# Patient Record
Sex: FEMALE | Race: WHITE | Employment: FULL TIME | URBAN - METROPOLITAN AREA
[De-identification: names, ages, dates, MRNs, and addresses within clinical notes are randomized per-mention and may not be internally consistent; named-entity substitution may affect disease eponyms.]

---

## 2017-02-05 RX ORDER — ONDANSETRON 4 MG/1
TABLET, ORALLY DISINTEGRATING ORAL
Qty: 15 TAB | Refills: 0 | Status: SHIPPED | OUTPATIENT
Start: 2017-02-05 | End: 2017-06-14

## 2017-02-19 RX ORDER — FLUOXETINE HYDROCHLORIDE 40 MG/1
CAPSULE ORAL
Qty: 30 CAP | Refills: 0 | Status: SHIPPED | OUTPATIENT
Start: 2017-02-19 | End: 2017-03-23 | Stop reason: SDUPTHER

## 2017-02-19 RX ORDER — ATORVASTATIN CALCIUM 80 MG/1
TABLET, FILM COATED ORAL
Qty: 90 TAB | Refills: 0 | Status: SHIPPED | OUTPATIENT
Start: 2017-02-19 | End: 2017-06-14

## 2017-03-23 RX ORDER — FLUOXETINE HYDROCHLORIDE 40 MG/1
CAPSULE ORAL
Qty: 30 CAP | Refills: 0 | Status: SHIPPED | OUTPATIENT
Start: 2017-03-23 | End: 2017-06-06 | Stop reason: SDUPTHER

## 2017-06-02 RX ORDER — ALPRAZOLAM 0.25 MG/1
TABLET ORAL
Qty: 30 TAB | Refills: 0 | Status: CANCELLED | OUTPATIENT
Start: 2017-06-02

## 2017-06-02 RX ORDER — FLUOXETINE HYDROCHLORIDE 40 MG/1
CAPSULE ORAL
Qty: 30 CAP | Refills: 0 | Status: CANCELLED | OUTPATIENT
Start: 2017-06-02

## 2017-06-06 RX ORDER — FLUOXETINE HYDROCHLORIDE 40 MG/1
CAPSULE ORAL
Qty: 30 CAP | Refills: 0 | Status: SHIPPED | OUTPATIENT
Start: 2017-06-06 | End: 2017-07-09 | Stop reason: SDUPTHER

## 2017-06-06 RX ORDER — ALPRAZOLAM 0.25 MG/1
TABLET ORAL
Qty: 30 TAB | Refills: 0 | Status: SHIPPED | OUTPATIENT
Start: 2017-06-06 | End: 2017-06-27 | Stop reason: SDUPTHER

## 2017-06-06 NOTE — TELEPHONE ENCOUNTER
Called, spoke to pt. Two pt identifiers confirmed. Pt informed that 30 day supply has been called in. Pt verbalized understanding of information discussed w/ no further questions at this time.

## 2017-06-06 NOTE — TELEPHONE ENCOUNTER
PSR spoke w/ pt to schedule f/u appt. Pt is scheduled for Wednesday 6/14/17 @ 12:15. Pt also is inquiring about if her pending Rx's will be filled prior to visit. Pt can best be reached at [phone] in regards to matter.

## 2017-06-06 NOTE — TELEPHONE ENCOUNTER
The following prescription was called into pt's local pharmacy:    ALPRAZolam Supriya Stager) 0.25 mg tablet [[id number]]   Order Details   Dose, Route, Frequency: As Directed    Dispense Quantity:  30 Tab Refills:  0 Fills Remaining:  --           Sig: TAKE 1 TABLET BY MOUTH EVERY EVENING AS NEEDED          Written Date:  06/06/17 Expiration Date:  --     Start Date:  06/06/17 End Date:  --            Ordering Provider:  -- YESICA #:  -- NPI:  --    Authorizing Provider:  Yusef Gallegos MD YESICA #:  [id number] NPI:  [id number]       Original Order:  ALPRAZolam Supriya Stager) 0.25 mg tablet [[id number]]      Pharmacy:  [facility] [address] [place] [address] #:  [id number]     Pharmacy Comments:  --          Quantity Remaining:  -- Quantity Filled:  --

## 2017-06-06 NOTE — TELEPHONE ENCOUNTER
Patient states she needs a call back today in reference to refill request from 6/2/17. Patient states she needs to know status of her request as she is out of medication & has been for 3 days & is starting to feel side effects. Reminded of 48 business hour turn around for future refill requests. Please call to advise.  Thank you

## 2017-06-14 ENCOUNTER — OFFICE VISIT (OUTPATIENT)
Dept: INTERNAL MEDICINE CLINIC | Age: 62
End: 2017-06-14

## 2017-06-14 VITALS
OXYGEN SATURATION: 97 % | WEIGHT: 174 LBS | HEART RATE: 75 BPM | DIASTOLIC BLOOD PRESSURE: 81 MMHG | SYSTOLIC BLOOD PRESSURE: 133 MMHG | TEMPERATURE: 97.9 F | HEIGHT: 65 IN | BODY MASS INDEX: 28.99 KG/M2 | RESPIRATION RATE: 16 BRPM

## 2017-06-14 DIAGNOSIS — I10 ESSENTIAL HYPERTENSION: Primary | ICD-10-CM

## 2017-06-14 DIAGNOSIS — E78.00 PURE HYPERCHOLESTEROLEMIA: ICD-10-CM

## 2017-06-14 DIAGNOSIS — F32.9 REACTIVE DEPRESSION: ICD-10-CM

## 2017-06-14 DIAGNOSIS — R73.03 PREDIABETES: ICD-10-CM

## 2017-06-14 DIAGNOSIS — Z99.89 OSA ON CPAP: ICD-10-CM

## 2017-06-14 DIAGNOSIS — G47.33 OSA ON CPAP: ICD-10-CM

## 2017-06-14 DIAGNOSIS — Z87.891 PERSONAL HISTORY OF NICOTINE DEPENDENCE: ICD-10-CM

## 2017-06-14 NOTE — PROGRESS NOTES
HISTORY OF PRESENT ILLNESS  Ramona Ryan is a 63 y.o. female. HPI   Last here 16. Pt is here to f/u on chronic conditions. Pt is not fasting today. BP today is 133/81  BP at home running around 130/80  Continues losartan 50mg daily    Continues lipitor 80mg daily for cholesterol     Reviewed last labs   Ordered fasting labs today  She will complete these next week. Wt is up 14 lbs since last visit   She attributes this to quitting smoking. Discussed diet and weight loss. Pt has seen urogyn-- Dr. Edward Marsh-- since last visit for recurrent hematuria  She did not f/u with evaluation and testing, has trouble getting off of work  Still needs to complete evaluation    Continues prozac to 40mg daily   She is happy with this dose, working well for depression  She notes feeling more tired recently, but notes she has had an increased work load at work   Pt has xanax to use prn as well, works well   Pt takes xanax about once weekly or less     Not compliant with cpap nightly for JUAN ANTONIO  She reports recent fatigue and waking up feeling tired. She has not seen Dr. Sunday Tamayo (sleep) recently   Advised her to f/u with Dr. Sunday Tamayo for cpap     Pt has quit smoking last year  Encouraged continued cessation  Discussed CT lung screen - She is amenable to this. Reviewed hcm    PREVENTIVE:    Colonoscopy: 08, due in 2018, at [facility]    Pap: hysterectomy and bso in her 30's for endometriosis. Dr Gini Hyman 2012 will see every other year--will schedule, reminded again  -- reminded again   Mammogram 1/15 --still due, reminded again to schedule, ordered again for her   Dexa: due , declines for now, wants to wait till age 63.    Tdap: 2012  Pneumovax: not yet    Zostavax: not yet    Flu shot: given today 16  Hep C Screenin/24/15, negative  Lipids .    A1c:  5.8,  5.8  EKG  nsr; repeat today  Eye exam: ; [facility]    Patient Active Problem List Diagnosis Date Noted    Depression 09/21/2012    Anxiety 09/21/2012    Hyperlipidemia 09/21/2012    HTN (hypertension) 09/21/2012    Microscopic hematuria 09/21/2012    Fatty liver 09/21/2012    JUAN ANTONIO on CPAP 09/21/2012     Current Outpatient Prescriptions   Medication Sig Dispense Refill    FLUoxetine (PROZAC) 40 mg capsule TAKE 1 CAPSULE BY MOUTH DAILY 30 Cap 0    ALPRAZolam (XANAX) 0.25 mg tablet TAKE 1 TABLET BY MOUTH EVERY EVENING AS NEEDED 30 Tab 0    losartan (COZAAR) 50 mg tablet Take 1 Tab by mouth daily. 30 Tab 0    atorvastatin (LIPITOR) 80 mg tablet TAKE 1 TABLET BY MOUTH NIGHTLY 90 Tab 0    nystatin (MYCOSTATIN) powder aaa tid prn 60 g 0    atorvastatin (LIPITOR) 80 mg tablet TAKE 1 TABLET BY MOUTH NIGHTLY 90 Tab 0    ondansetron (ZOFRAN ODT) 4 mg disintegrating tablet DISSOLVE 1 TABLET ON THE TONGUE EVERY 8 HOURS AS NEEDED FOR NAUSEA 15 Tab 0    gabapentin (NEURONTIN) 300 mg capsule Take 1 Cap by mouth two (2) times a day. 60 Cap 1    HYDROcodone-acetaminophen (NORCO) 5-325 mg per tablet Take 1 Tab by mouth every eight (8) hours as needed for Pain. Max Daily Amount: 3 Tabs. 20 Tab 0    ergocalciferol (ERGOCALCIFEROL) 50,000 unit capsule Take 1 Cap by mouth every seven (7) days. 16 Cap 0    albuterol (PROAIR HFA) 90 mcg/actuation inhaler Take 2 Puffs by inhalation every four (4) hours as needed for Wheezing.  1 Inhaler 3    FLUoxetine (PROZAC) 20 mg capsule TAKE 1 CAPSULE BY MOUTH EVERY DAY 30 Cap 0    doxycycline (VIBRAMYCIN) 50 mg capsule        Past Surgical History:   Procedure Laterality Date    HX HYSTERECTOMY      HX ORTHOPAEDIC      right foot surgery    HX TONSILLECTOMY        Lab Results  Component Value Date/Time   WBC 6.2 11/18/2016 08:49 AM   HGB 14.4 11/18/2016 08:49 AM   HCT 42.3 11/18/2016 08:49 AM   PLATELET 183 48/46/6495 08:49 AM   MCV 91 11/18/2016 08:49 AM     Lab Results  Component Value Date/Time   Cholesterol, total 226 11/18/2016 08:49 AM   HDL Cholesterol 87 11/18/2016 08:49 AM   LDL, calculated 121 11/18/2016 08:49 AM   Triglyceride 90 11/18/2016 08:49 AM     Lab Results  Component Value Date/Time   GFR est non-AA 95 11/18/2016 08:49 AM   GFR est  11/18/2016 08:49 AM   Creatinine 0.70 11/18/2016 08:49 AM   BUN 14 11/18/2016 08:49 AM   Sodium 143 11/18/2016 08:49 AM   Potassium 4.2 11/18/2016 08:49 AM   Chloride 103 11/18/2016 08:49 AM   CO2 25 11/18/2016 08:49 AM        Review of Systems   Constitutional: Positive for malaise/fatigue. Respiratory: Negative for shortness of breath. Cardiovascular: Negative for chest pain. Physical Exam   Constitutional: She is oriented to person, place, and time. She appears well-developed and well-nourished. No distress. HENT:   Head: Normocephalic and atraumatic. Right Ear: External ear normal.   Left Ear: External ear normal.   Mouth/Throat: Oropharynx is clear and moist. No oropharyngeal exudate. Dry mouth   Eyes: Conjunctivae and EOM are normal. Right eye exhibits no discharge. Left eye exhibits no discharge. Neck: Normal range of motion. Neck supple. Cardiovascular: Normal rate, regular rhythm, normal heart sounds and intact distal pulses. Exam reveals no gallop and no friction rub. No murmur heard. Pulmonary/Chest: Effort normal and breath sounds normal. No respiratory distress. She has no wheezes. She has no rales. She exhibits no tenderness. Abdominal: Soft. She exhibits no distension and no mass. There is no tenderness. There is no rebound and no guarding. Musculoskeletal: Normal range of motion. She exhibits no edema, tenderness or deformity. Lymphadenopathy:     She has no cervical adenopathy. Neurological: She is alert and oriented to person, place, and time. Coordination normal.   Skin: Skin is warm and dry. No rash noted. She is not diaphoretic. No erythema. No pallor. Psychiatric: She has a normal mood and affect.  Her behavior is normal.       ASSESSMENT and PLAN    ICD-10-CM ICD-9-CM    1. Essential hypertension    Controlled on losartan 50mg daily. Continue. I10 401.9 REFERRAL TO SLEEP STUDIES      CBC W/O DIFF      METABOLIC PANEL, COMPREHENSIVE      TSH 3RD GENERATION      HEMOGLOBIN A1C WITH EAG      LIPID PANEL   2. Personal history of nicotine dependence    CT lung cancer screen ordered. Z87.891 V15.82 CT LOW DOSE LUNG CANCER SCREENING      REFERRAL TO SLEEP STUDIES      CBC W/O DIFF      METABOLIC PANEL, COMPREHENSIVE      TSH 3RD GENERATION      HEMOGLOBIN A1C WITH EAG      LIPID PANEL   3. JUAN ANTONIO on CPAP    Not wearing cpap. Gained weight and feeling more fatigued. Advised her to schedule f/u with Dr. Sunday Tamayo. G47.33 327.23 REFERRAL TO SLEEP STUDIES    Z99.89 V46.8 CBC W/O DIFF      METABOLIC PANEL, COMPREHENSIVE      TSH 3RD GENERATION      HEMOGLOBIN A1C WITH EAG      LIPID PANEL   4. Pure hypercholesterolemia    On max dose lipitor 80mg. Weight has climbed. Needs to focus on weight loss as LDL is not at goal. Cannot increase dose of lipitor. May need to change to crestor to see if this is more effective. Labs ordered. E78.00 272.0 REFERRAL TO SLEEP STUDIES      CBC W/O DIFF      METABOLIC PANEL, COMPREHENSIVE      TSH 3RD GENERATION      HEMOGLOBIN A1C WITH EAG      LIPID PANEL   5. Prediabetes    Weight up over 10 lbs. Check A1c today and evaluate for progression towards diabetes. Focus on weight loss. R73.03 790.29 REFERRAL TO SLEEP STUDIES      CBC W/O DIFF      METABOLIC PANEL, COMPREHENSIVE      TSH 3RD GENERATION      HEMOGLOBIN A1C WITH EAG      LIPID PANEL   6. Reactive depression    Controlled on prozac 40mg. Continue. F32.9 300.4 REFERRAL TO SLEEP STUDIES      CBC W/O DIFF      METABOLIC PANEL, COMPREHENSIVE      TSH 3RD GENERATION      HEMOGLOBIN A1C WITH EAG      LIPID PANEL        Written by Pete Leon, as dictated by Ceasar Sánchez MD.     Current diagnosis and concerns discussed with pt at length.  Understands risks and benefits or current treatment plan and medications and accepts the treatment and medication with any possible risks.   Pt asks appropriate questions which were answered.   Pt instructed to call with any concerns or problems. This note will not be viewable in [address]

## 2017-06-14 NOTE — MR AVS SNAPSHOT
Visit Information Date & Time Provider Department Dept. Phone Encounter #  
 6/14/2017 12:15 PM Emmanuelle Triplett, [address] [facility] [phone] [id number] Follow-up Instructions Return in about 6 months (around 12/14/2017). Upcoming Health Maintenance Date Due  
 PAP AKA CERVICAL CYTOLOGY 9/12/2015 BREAST CANCER SCRN MAMMOGRAM 1/3/2017 INFLUENZA AGE 9 TO ADULT 8/1/2017 COLONOSCOPY 2/8/2018 DTaP/Tdap/Td series (2 - Td) 9/21/2022 Allergies as of 6/14/2017  Review Complete On: 6/14/2017 By: Emmanuelle Triplett MD  
 No Known Allergies Current Immunizations  Reviewed on 9/21/2012 Name Date Influenza Vaccine 11/8/2014, 10/15/2013 Influenza Vaccine (Quad) PF 11/18/2016, 11/30/2015 TDAP Vaccine 9/21/2012 Not reviewed this visit You Were Diagnosed With   
  
 Codes Comments Essential hypertension    -  Primary ICD-10-CM: I10 
ICD-9-CM: 401.9 Personal history of nicotine dependence     ICD-10-CM: D31.621 ICD-9-CM: V15.82 JUAN ANTONIO on CPAP     ICD-10-CM: G47.33, Z99.89 ICD-9-CM: 327.23, V46.8 Pure hypercholesterolemia     ICD-10-CM: E78.00 ICD-9-CM: 272.0 Prediabetes     ICD-10-CM: R73.03 
ICD-9-CM: 790.29 Reactive depression     ICD-10-CM: F32.9 ICD-9-CM: 300.4 Vitals BP Pulse Temp Resp Height(growth percentile) Weight(growth percentile) 133/81 (BP 1 Location: Left arm, BP Patient Position: Sitting) 75 97.9 °F (36.6 °C) (Oral) 16 5' 5\" (1.651 m) 174 lb (78.9 kg) SpO2 BMI OB Status Smoking Status 97% 28.96 kg/m2 Hysterectomy Former Smoker Vitals History BMI and BSA Data Body Mass Index Body Surface Area  
 28.96 kg/m 2 1.9 m 2 Preferred Pharmacy Pharmacy Name Phone [facility] [phone], [facility] [place] [address] [phone] Your Updated Medication List  
  
   
 This list is accurate as of: 6/14/17 12:32 PM.  Always use your most recent med list.  
  
  
  
  
 albuterol 90 mcg/actuation inhaler Commonly known as:  PROAIR HFA Take 2 Puffs by inhalation every four (4) hours as needed for Wheezing. ALPRAZolam 0.25 mg tablet Commonly known as:  XANAX  
TAKE 1 TABLET BY MOUTH EVERY EVENING AS NEEDED  
  
 atorvastatin 80 mg tablet Commonly known as:  LIPITOR  
TAKE 1 TABLET BY MOUTH NIGHTLY FLUoxetine 40 mg capsule Commonly known as:  PROzac TAKE 1 CAPSULE BY MOUTH DAILY losartan 50 mg tablet Commonly known as:  COZAAR Take 1 Tab by mouth daily. nystatin powder Commonly known as:  MYCOSTATIN  
aaa tid prn We Performed the Following CBC W/O DIFF [43432 CPT(R)] HEMOGLOBIN A1C WITH EAG [36845 CPT(R)] LIPID PANEL [76302 CPT(R)] METABOLIC PANEL, COMPREHENSIVE [27184 CPT(R)] REFERRAL TO SLEEP STUDIES [REF99 Custom] Comments:  
 Please evaluate patient for yobany TSH 3RD GENERATION [21572 CPT(R)] Follow-up Instructions Return in about 6 months (around 12/14/2017). To-Do List   
 06/14/2017 Imaging:  CT LOW DOSE LUNG CANCER SCREENING Referral Information Referral ID Referred By Referred To  
  
 [id number] Cheryle Gray Not Available Visits Status Start Date End Date 1 New Request 6/14/17 6/14/18 If your referral has a status of pending review or denied, additional information will be sent to support the outcome of this decision. Referral ID Referred By Referred To  
 [id number] Yumi Mancini MD  
   [address] [place] [address] [address] Phone: [phone] Fax: [phone] Visits Status Start Date End Date 1 New Request 6/14/17 6/14/18 If your referral has a status of pending review or denied, additional information will be sent to support the outcome of this decision. Introducing [facility]! Dear Jerson No: 
Thank you for requesting a [employer] account. Our records indicate that you already have an active [employer] account. You can access your account anytime at [email or web address] Did you know that you can access your hospital and ER discharge instructions at any time in [employer]? You can also review all of your test results from your hospital stay or ER visit. Additional Information If you have questions, please visit the Frequently Asked Questions section of the [employer] website at [email or web address] Remember, [employer] is NOT to be used for urgent needs. For medical emergencies, dial 911. Now available from your iPhone and Android! Please provide this summary of care documentation to your next provider. Your primary care clinician is listed as Mary Law. If you have any questions after today's visit, please call [phone].

## 2017-06-20 LAB
ALBUMIN SERPL-MCNC: 4.4 G/DL (ref 3.5–5.5)
ALBUMIN/GLOB SERPL: 1.7 {RATIO} (ref 1.2–2.2)
ALP SERPL-CCNC: 109 IU/L (ref 39–117)
ALT SERPL-CCNC: 27 IU/L (ref 0–32)
AST SERPL-CCNC: 18 IU/L (ref 0–40)
BILIRUB SERPL-MCNC: 0.6 MG/DL (ref 0–1.2)
BUN SERPL-MCNC: 10 MG/DL (ref 6–24)
BUN/CREAT SERPL: 14 (ref 9–23)
CALCIUM SERPL-MCNC: 9.2 MG/DL (ref 8.7–10.2)
CHLORIDE SERPL-SCNC: 99 MMOL/L (ref 96–106)
CHOLEST SERPL-MCNC: 212 MG/DL (ref 100–199)
CO2 SERPL-SCNC: 25 MMOL/L (ref 18–29)
CREAT SERPL-MCNC: 0.7 MG/DL (ref 0.57–1)
ERYTHROCYTE [DISTWIDTH] IN BLOOD BY AUTOMATED COUNT: 12.9 % (ref 12.3–15.4)
EST. AVERAGE GLUCOSE BLD GHB EST-MCNC: 108 MG/DL
GLOBULIN SER CALC-MCNC: 2.6 G/DL (ref 1.5–4.5)
GLUCOSE SERPL-MCNC: 101 MG/DL (ref 65–99)
HBA1C MFR BLD: 5.4 % (ref 4.8–5.6)
HCT VFR BLD AUTO: 41.5 % (ref 34–46.6)
HDLC SERPL-MCNC: 93 MG/DL
HGB BLD-MCNC: 13.7 G/DL (ref 11.1–15.9)
LDLC SERPL CALC-MCNC: 98 MG/DL (ref 0–99)
MCH RBC QN AUTO: 30.6 PG (ref 26.6–33)
MCHC RBC AUTO-ENTMCNC: 33 G/DL (ref 31.5–35.7)
MCV RBC AUTO: 93 FL (ref 79–97)
PLATELET # BLD AUTO: 322 X10E3/UL (ref 150–379)
POTASSIUM SERPL-SCNC: 4.5 MMOL/L (ref 3.5–5.2)
PROT SERPL-MCNC: 7 G/DL (ref 6–8.5)
RBC # BLD AUTO: 4.48 X10E6/UL (ref 3.77–5.28)
SODIUM SERPL-SCNC: 141 MMOL/L (ref 134–144)
TRIGL SERPL-MCNC: 106 MG/DL (ref 0–149)
TSH SERPL DL<=0.005 MIU/L-ACNC: 1.46 UIU/ML (ref 0.45–4.5)
VLDLC SERPL CALC-MCNC: 21 MG/DL (ref 5–40)
WBC # BLD AUTO: 6.2 X10E3/UL (ref 3.4–10.8)

## 2017-06-26 RX ORDER — ALPRAZOLAM 0.25 MG/1
TABLET ORAL
Qty: 30 TAB | Refills: 0 | OUTPATIENT
Start: 2017-06-26

## 2017-06-26 NOTE — TELEPHONE ENCOUNTER
This was filled 2 weeks ago and pt only uses it about once per week so should have plenty left can you find out if the script from 2 weeks ago was actually called in?

## 2017-06-27 RX ORDER — ALPRAZOLAM 0.25 MG/1
TABLET ORAL
Qty: 30 TAB | Refills: 0 | Status: SHIPPED | OUTPATIENT
Start: 2017-06-27 | End: 2017-08-29 | Stop reason: SDUPTHER

## 2017-06-28 NOTE — PROGRESS NOTES
Called in following rx to pharmacy:       ALPRAZolam Vaibhav Watters 0.25 mg tablet [[id number]]   Order Details   Dose, Route, Frequency: As Directed    Dispense Quantity:  30 Tab Refills:  0 Fills Remaining:  --           Sig: TAKE 1 TABLET BY MOUTH EVERY EVENING AS NEEDED

## 2017-08-15 RX ORDER — LOSARTAN POTASSIUM 50 MG/1
TABLET ORAL
Qty: 30 TAB | Refills: 0 | Status: SHIPPED | OUTPATIENT
Start: 2017-08-15 | End: 2017-09-11 | Stop reason: SDUPTHER

## 2017-08-29 RX ORDER — ALPRAZOLAM 0.25 MG/1
TABLET ORAL
Qty: 30 TAB | Refills: 0 | Status: SHIPPED | OUTPATIENT
Start: 2017-08-29 | End: 2017-11-29 | Stop reason: SDUPTHER

## 2017-08-30 ENCOUNTER — TELEPHONE (OUTPATIENT)
Dept: INTERNAL MEDICINE CLINIC | Age: 62
End: 2017-08-30

## 2017-08-30 NOTE — TELEPHONE ENCOUNTER
The following prescription was called into pt's local pharmacy:    ALPRAZolam Danica Linda) 0.25 mg tablet [[id number]]   Order Details   Dose, Route, Frequency: As Directed    Dispense Quantity:  30 Tab Refills:  0 Fills Remaining:  --           Sig: TAKE 1 TABLET BY MOUTH EVERY EVENING AS NEEDED          Written Date:  08/29/17 Expiration Date:  --     Start Date:  08/29/17 End Date:  --            Ordering Provider:  -- YESICA #:  -- NPI:  --    Authorizing Provider:  Sarah Pham MD YESICA #:  [id number] NPI:  [id number]    Ordering User:  Sarah Pham MD               Original Order:  ALPRAZolam Danica Linda) 0.25 mg tablet [[id number]]      Pharmacy:  [facility] [address] [place] [address] [address] AT [place] [address] #:  [id number]     Pharmacy Comments:  --          Quantity Remaining:  -- Quantity Filled:  --

## 2017-09-11 RX ORDER — LOSARTAN POTASSIUM 50 MG/1
TABLET ORAL
Qty: 30 TAB | Refills: 0 | Status: SHIPPED | OUTPATIENT
Start: 2017-09-11 | End: 2017-10-08 | Stop reason: SDUPTHER

## 2017-11-29 RX ORDER — ALPRAZOLAM 0.25 MG/1
TABLET ORAL
Qty: 30 TAB | Refills: 0 | Status: SHIPPED | OUTPATIENT
Start: 2017-11-29 | End: 2018-03-27 | Stop reason: SDUPTHER

## 2017-11-30 ENCOUNTER — DOCUMENTATION ONLY (OUTPATIENT)
Dept: INTERNAL MEDICINE CLINIC | Age: 62
End: 2017-11-30

## 2017-11-30 NOTE — PROGRESS NOTES
Following rx was faxed to pharmacy with confirmation received:      ALPRAZolam (XANAX) 0.25 mg tablet [[id number]]   Order Details   Dose, Route, Frequency: As Directed    Dispense Quantity:  30 Tab Refills:  0 Fills Remaining:  --           Sig: TAKE 1 TABLET BY MOUTH EVERY EVENING AS NEEDED          Written Date:  11/29/17 Expiration Date:  --     Start Date:  11/29/17 End Date:  --

## 2018-01-12 RX ORDER — FLUOXETINE HYDROCHLORIDE 40 MG/1
CAPSULE ORAL
Qty: 90 CAP | Refills: 0 | Status: SHIPPED | OUTPATIENT
Start: 2018-01-12 | End: 2018-07-16 | Stop reason: SDUPTHER

## 2018-01-12 RX ORDER — ATORVASTATIN CALCIUM 80 MG/1
TABLET, FILM COATED ORAL
Qty: 90 TAB | Refills: 0 | Status: SHIPPED | OUTPATIENT
Start: 2018-01-12 | End: 2018-07-30

## 2018-03-27 DIAGNOSIS — F41.9 ANXIETY: Primary | ICD-10-CM

## 2018-03-29 ENCOUNTER — TELEPHONE (OUTPATIENT)
Dept: INTERNAL MEDICINE CLINIC | Age: 63
End: 2018-03-29

## 2018-03-29 RX ORDER — ALPRAZOLAM 0.25 MG/1
TABLET ORAL
Qty: 30 TAB | Refills: 0 | Status: SHIPPED | OUTPATIENT
Start: 2018-03-29 | End: 2018-11-17 | Stop reason: SDUPTHER

## 2018-03-29 NOTE — TELEPHONE ENCOUNTER
Pt is requesting a call back concerning a sooner appt. then 05/01/18.  Best contact [phone]       Message received & copied from [facility]

## 2018-03-29 NOTE — TELEPHONE ENCOUNTER
Called and spoke to pt. Two pt identifiers confirmed. Told pt she was over due for f/u. Scheduled pt for 5/1/18 at 1430. This appointment was the earliest the pt could make due to work schedule. Told pt I would send for approval.  No further questions at time of call.

## 2018-03-30 NOTE — TELEPHONE ENCOUNTER
Following rx was faxed to pharmacy with confirmation received:      ALPRAZolam (XANAX) 0.25 mg tablet  TAKE 1 TABLET BY MOUTH EVERY EVENING AS NEEDED       Disp: 30 Tab Refills: 0    Class: Print Start: 3/29/2018   For: Anxiety

## 2018-03-30 NOTE — TELEPHONE ENCOUNTER
Called, spoke to pt. Two pt identifiers confirmed. Pt offered and accepted appt for 4/17/18 @ 12:15pm.  Pt verbalized understanding of information discussed w/ no further questions at this time.

## 2018-04-17 ENCOUNTER — OFFICE VISIT (OUTPATIENT)
Dept: INTERNAL MEDICINE CLINIC | Age: 63
End: 2018-04-17

## 2018-04-17 VITALS
SYSTOLIC BLOOD PRESSURE: 182 MMHG | TEMPERATURE: 97.9 F | DIASTOLIC BLOOD PRESSURE: 90 MMHG | RESPIRATION RATE: 16 BRPM | HEIGHT: 65 IN | OXYGEN SATURATION: 99 % | BODY MASS INDEX: 27.82 KG/M2 | WEIGHT: 167 LBS | HEART RATE: 77 BPM

## 2018-04-17 DIAGNOSIS — R73.01 IFG (IMPAIRED FASTING GLUCOSE): ICD-10-CM

## 2018-04-17 DIAGNOSIS — Z00.00 PHYSICAL EXAM: ICD-10-CM

## 2018-04-17 DIAGNOSIS — R21 RASH: ICD-10-CM

## 2018-04-17 DIAGNOSIS — N30.01 ACUTE CYSTITIS WITH HEMATURIA: ICD-10-CM

## 2018-04-17 DIAGNOSIS — E78.00 PURE HYPERCHOLESTEROLEMIA: ICD-10-CM

## 2018-04-17 DIAGNOSIS — Z99.89 OSA ON CPAP: ICD-10-CM

## 2018-04-17 DIAGNOSIS — E66.3 OVERWEIGHT: ICD-10-CM

## 2018-04-17 DIAGNOSIS — B37.9 CANDIDA INFECTION: ICD-10-CM

## 2018-04-17 DIAGNOSIS — I10 ESSENTIAL HYPERTENSION: Primary | ICD-10-CM

## 2018-04-17 DIAGNOSIS — G47.33 OSA ON CPAP: ICD-10-CM

## 2018-04-17 DIAGNOSIS — F32.9 REACTIVE DEPRESSION: ICD-10-CM

## 2018-04-17 DIAGNOSIS — R31.29 MICROSCOPIC HEMATURIA: ICD-10-CM

## 2018-04-17 LAB
BILIRUB UR QL STRIP: NEGATIVE
GLUCOSE UR-MCNC: NEGATIVE MG/DL
KETONES P FAST UR STRIP-MCNC: NEGATIVE MG/DL
PH UR STRIP: 6.5 [PH] (ref 4.6–8)
PROT UR QL STRIP: NEGATIVE
SP GR UR STRIP: 1.02 (ref 1–1.03)
UA UROBILINOGEN AMB POC: NORMAL (ref 0.2–1)
URINALYSIS CLARITY POC: CLEAR
URINALYSIS COLOR POC: YELLOW
URINE BLOOD POC: NORMAL
URINE LEUKOCYTES POC: NORMAL
URINE NITRITES POC: NEGATIVE

## 2018-04-17 RX ORDER — NYSTATIN 100000 [USP'U]/G
POWDER TOPICAL
Qty: 60 G | Refills: 0 | Status: SHIPPED | OUTPATIENT
Start: 2018-04-17 | End: 2019-07-22 | Stop reason: SDUPTHER

## 2018-04-17 RX ORDER — LOSARTAN POTASSIUM AND HYDROCHLOROTHIAZIDE 12.5; 5 MG/1; MG/1
1 TABLET ORAL DAILY
Qty: 90 TAB | Refills: 1 | Status: SHIPPED | OUTPATIENT
Start: 2018-04-17 | End: 2018-07-30 | Stop reason: DRUGHIGH

## 2018-04-17 RX ORDER — NITROFURANTOIN 25; 75 MG/1; MG/1
100 CAPSULE ORAL 2 TIMES DAILY
Qty: 10 CAP | Refills: 0 | Status: SHIPPED | OUTPATIENT
Start: 2018-04-17 | End: 2018-04-22

## 2018-04-17 RX ORDER — HYDROCHLOROTHIAZIDE 12.5 MG/1
12.5 TABLET ORAL DAILY
Qty: 30 TAB | Refills: 0 | Status: SHIPPED | OUTPATIENT
Start: 2018-04-17 | End: 2018-07-30

## 2018-04-17 RX ORDER — CHLORHEXIDINE GLUCONATE 4 G/100ML
SOLUTION TOPICAL
Qty: 1 BOTTLE | Refills: 1 | Status: SHIPPED | OUTPATIENT
Start: 2018-04-17 | End: 2018-07-30

## 2018-04-17 NOTE — PROGRESS NOTES
HISTORY OF PRESENT ILLNESS  Geovani Tong is a 63 y.o. female. HPI   Last here 6/14/17.  Pt is here to f/u on chronic conditions.     BP today is 177/98, will repeat this today   BP is 155/88, 120/78 at home  Continues losartan 50mg daily  She took this med today  Will change losartan to losartan-HCTZ 50-12.5mg daily       Reviewed labs 6/17   Will get labs today     Pt c/o rash on groin  Ordered nystatin    Pt c/o \"puffiness\" to L side of neck  Discussed her sx being indicative of a supraclavicular fat pad    Pt c/o sores all around her body (i.e. Head, back, legs and arms)  Pt saw Dr. Carlos Manuel Berry (derm) for her sx - will get notes for review  Pt was told that she had a black head under her R armpit   Pt was told that she picks the spots around her body  Pt wears long-sleeve shirts and cuts her finger nails short to prevent herself from picking these spots  Ordered hibiclens wash    Pt has seen Dr. Shantell Abdullahi (uro-gyn) for recurrent hematuria  Pt did not have a full evaluation, as she had trouble getting off from work  Today, pt c/o hesitancy when urinating and urgency  Pt denies having F/C, N/V or dysuria  Will get a UA - hematuria  Will send urine for culture   Ordered macrobid BID  Discussed importance of scheduling a cystoscopy     Continues lipitor 80mg daily for cholesterol     Continues prozac to 40mg daily for depression, which works well, happy with dose   Pt has xanax to use prn (once weekly or less) as well, works well      Pt is noncompliant with CPAP qhs for JUAN ANTONIO  Advised her to f/u with Dr. Sue Garza (sleep) for CPAP         Patient Active Problem List    Diagnosis Date Noted    Depression 09/21/2012    Anxiety 09/21/2012    Hyperlipidemia 09/21/2012    HTN (hypertension) 09/21/2012    Microscopic hematuria 09/21/2012    Fatty liver 09/21/2012    JUAN ANTONIO on CPAP 09/21/2012     Current Outpatient Prescriptions   Medication Sig Dispense Refill    PSEUDOEPHEDRINE/ACETAMINOPHEN (TYLENOL SINUS PO) Take  by mouth.      ALPRAZolam (XANAX) 0.25 mg tablet TAKE 1 TABLET BY MOUTH EVERY EVENING AS NEEDED 30 Tab 0    FLUoxetine (PROZAC) 40 mg capsule TAKE 1 CAPSULE BY MOUTH DAILY 90 Cap 0    atorvastatin (LIPITOR) 80 mg tablet TAKE 1 TABLET BY MOUTH NIGHTLY 90 Tab 0    losartan (COZAAR) 50 mg tablet TAKE 1 TABLET BY MOUTH DAILY 30 Tab 6    atorvastatin (LIPITOR) 80 mg tablet TAKE 1 TABLET BY MOUTH NIGHTLY 90 Tab 1    albuterol (PROAIR HFA) 90 mcg/actuation inhaler Take 2 Puffs by inhalation every four (4) hours as needed for Wheezing. 1 Inhaler 3    losartan (COZAAR) 50 mg tablet Take 1 Tab by mouth daily. 30 Tab 0    atorvastatin (LIPITOR) 80 mg tablet TAKE 1 TABLET BY MOUTH NIGHTLY 90 Tab 0    nystatin (MYCOSTATIN) powder aaa tid prn 60 g 0     Past Surgical History:   Procedure Laterality Date    HX HYSTERECTOMY      HX ORTHOPAEDIC      right foot surgery    HX TONSILLECTOMY        Lab Results  Component Value Date/Time   WBC 6.2 06/19/2017 12:00 AM   HGB 13.7 06/19/2017 12:00 AM   HCT 41.5 06/19/2017 12:00 AM   PLATELET 309 86/07/6635 12:00 AM   MCV 93 06/19/2017 12:00 AM     Lab Results  Component Value Date/Time   Cholesterol, total 212 (H) 06/19/2017 12:00 AM   HDL Cholesterol 93 06/19/2017 12:00 AM   LDL, calculated 98 06/19/2017 12:00 AM   Triglyceride 106 06/19/2017 12:00 AM     Lab Results  Component Value Date/Time   GFR est non-AA 95 06/19/2017 12:00 AM   GFR est  06/19/2017 12:00 AM   Creatinine 0.70 06/19/2017 12:00 AM   BUN 10 06/19/2017 12:00 AM   Sodium 141 06/19/2017 12:00 AM   Potassium 4.5 06/19/2017 12:00 AM   Chloride 99 06/19/2017 12:00 AM   CO2 25 06/19/2017 12:00 AM        Review of Systems   Constitutional: Negative for chills and fever. Respiratory: Negative for shortness of breath. Cardiovascular: Negative for chest pain. Gastrointestinal: Negative for nausea and vomiting. Genitourinary: Positive for urgency. Negative for dysuria. Skin: Positive for itching and rash. Physical Exam   Constitutional: She is oriented to person, place, and time. She appears well-developed and well-nourished. No distress. HENT:   Head: Normocephalic and atraumatic. Right Ear: External ear normal.   Left Ear: External ear normal.   Mouth/Throat: Oropharynx is clear and moist. No oropharyngeal exudate. Eyes: Conjunctivae and EOM are normal. Pupils are equal, round, and reactive to light. Right eye exhibits no discharge. Left eye exhibits no discharge. No scleral icterus. Neck: Normal range of motion. Neck supple. No carotid bruits    Cardiovascular: Normal rate, regular rhythm, normal heart sounds and intact distal pulses. Exam reveals no gallop and no friction rub. No murmur heard. Pulmonary/Chest: Effort normal and breath sounds normal. No respiratory distress. She has no wheezes. She has no rales. She exhibits no tenderness. Abdominal: Soft. She exhibits no distension and no mass. There is no tenderness. There is no rebound and no guarding. Musculoskeletal: Normal range of motion. She exhibits tenderness (Mild L CVA). She exhibits no edema or deformity. Lymphadenopathy:     She has no cervical adenopathy. Neurological: She is alert and oriented to person, place, and time. Coordination normal.   Skin: Skin is warm and dry. No rash noted. She is not diaphoretic. No erythema. No pallor. Psychiatric: She has a normal mood and affect. Her behavior is normal.       ASSESSMENT and PLAN    ICD-10-CM ICD-9-CM    1. Essential hypertension    BP not controled, change losartan to losartan-HCTZ 50-12.5mg daily    I10 401.9    2. Microscopic hematuria    Chronic issue, had a partial eval, still needs to f/u with cystoscopy, addressed this today, she will call Dr. Otoniel Travis office   R39.24 695.76    3. Pure hypercholesterolemia    Controled on lipitor in 6/17, repeat lipids this Summer   E78.00 272.0    4. Reactive depression    Controled on prozac   F32.9 300.4    5.  JUAN ANTONIO on CPAP    Not compliant with CPAP, has not f/u with sleep specialist, addressed this again today, advised her to f/u   G47.33 327.23     Z99.89 V46.8    6. Acute cystitis with hematuria    Treat with macrobid BID, send for culture   N30.01 595.0      L03.53 K76.2 METABOLIC PANEL, COMPREHENSIVE      HEMOGLOBIN A1C WITH EAG      CYNDI 3D TRESSA W MAMMO BI SCREENING INCL CAD      DEXA BONE DENSITY STUDY AXIAL      REFERRAL TO GASTROENTEROLOGY   8. IFG (impaired fasting glucose)    Check a1c, continue with w/l   R73.01 790.21    9. Candida infection    Refilled nystatin powder    B37.9 112.9    10. Rash    Related to recurrent scratching, has seen derm for this in the past, advised not to scratch, will also give hibiclens to eradicate skin from staph, as she is at higher risk for MRSA infection   R21 782.1    11. Overweight    See above    E66.3 278.02         Scribed by Kaz Abdi, as dictated by Dr. Yael Villarreal. Current diagnosis and concerns discussed with pt at length. Pt understands risks and benefits or current treatment plan and medications, and accepts the treatment and medication with any possible risks. Pt asks appropriate questions, which were answered. Pt was instructed to call with any concerns or problems. This note will not be viewable in [address]

## 2018-04-17 NOTE — MR AVS SNAPSHOT
[address] [facility] 
[phone] Patient: Contreras Childers MRN: [id number] RYN:[id number] Visit Information Date & Time Provider Department Dept. Phone Encounter #  
 4/17/2018 12:15 PM Garima Mensah, [address]  [id number] Follow-up Instructions Return in about 3 months (around 7/17/2018). Upcoming Health Maintenance Date Due  
 PAP AKA CERVICAL CYTOLOGY 9/12/2015 BREAST CANCER SCRN MAMMOGRAM 1/3/2017 ZOSTER VACCINE AGE 60> 7/24/2017 Influenza Age 5 to Adult 8/1/2017 COLONOSCOPY 2/8/2018 DTaP/Tdap/Td series (2 - Td) 9/21/2022 Allergies as of 4/17/2018  Review Complete On: 4/17/2018 By: Carlo Mcgrath LPN No Known Allergies Current Immunizations  Reviewed on 9/21/2012 Name Date Influenza Vaccine 11/8/2014, 10/15/2013 Influenza Vaccine (Quad) PF 11/18/2016, 11/30/2015 TDAP Vaccine 9/21/2012 Not reviewed this visit You Were Diagnosed With   
  
 Codes Comments Essential hypertension    -  Primary ICD-10-CM: I10 
ICD-9-CM: 401.9 Microscopic hematuria     ICD-10-CM: R31.29 ICD-9-CM: 599.72 Pure hypercholesterolemia     ICD-10-CM: E78.00 ICD-9-CM: 272.0 Reactive depression     ICD-10-CM: F32.9 ICD-9-CM: 300.4 JUAN ANTONIO on CPAP     ICD-10-CM: G47.33, Z99.89 ICD-9-CM: 327.23, V46.8 Acute cystitis with hematuria     ICD-10-CM: N30.01 
ICD-9-CM: 595.0 Physical exam     ICD-10-CM: Z00.00 ICD-9-CM: V70.9 IFG (impaired fasting glucose)     ICD-10-CM: R73.01 
ICD-9-CM: 790.21 Candida infection     ICD-10-CM: B37.9 ICD-9-CM: 112.9 Rash     ICD-10-CM: R21 
ICD-9-CM: 782.1 Overweight     ICD-10-CM: T03.4 ICD-9-CM: 278.02 Vitals BP Pulse Temp Resp Height(growth percentile) Weight(growth percentile)  (!) 177/98 (BP 1 Location: Left arm, BP Patient Position: Sitting) 77 97.9 °F (36.6 °C) (Oral) 16 5' 5\" (1.651 m) 167 lb (75.8 kg) SpO2 BMI OB Status Smoking Status 99% 27.79 kg/m2 Hysterectomy Former Smoker Vitals History BMI and BSA Data Body Mass Index Body Surface Area  
 27.79 kg/m 2 1.86 m 2 Preferred Pharmacy Pharmacy Name Phone [facility] [address] [facility] [address], [address] AT [place] [address] [phone] Your Updated Medication List  
  
   
This list is accurate as of 4/17/18 12:26 PM.  Always use your most recent med list.  
  
  
  
  
 albuterol 90 mcg/actuation inhaler Commonly known as:  PROAIR HFA Take 2 Puffs by inhalation every four (4) hours as needed for Wheezing. ALPRAZolam 0.25 mg tablet Commonly known as:  XANAX  
TAKE 1 TABLET BY MOUTH EVERY EVENING AS NEEDED  
  
 * atorvastatin 80 mg tablet Commonly known as:  LIPITOR  
TAKE 1 TABLET BY MOUTH NIGHTLY * atorvastatin 80 mg tablet Commonly known as:  LIPITOR  
TAKE 1 TABLET BY MOUTH NIGHTLY * atorvastatin 80 mg tablet Commonly known as:  LIPITOR  
TAKE 1 TABLET BY MOUTH NIGHTLY  
  
 chlorhexidine 4 % liquid Commonly known as:  HIBICLENS  
3 times per week FLUoxetine 40 mg capsule Commonly known as:  PROzac TAKE 1 CAPSULE BY MOUTH DAILY losartan-hydroCHLOROthiazide 50-12.5 mg per tablet Commonly known as:  HYZAAR Take 1 Tab by mouth daily. nitrofurantoin (macrocrystal-monohydrate) 100 mg capsule Commonly known as:  MACROBID Take 1 Cap by mouth two (2) times a day for 5 days. nystatin powder Commonly known as:  MYCOSTATIN  
aaa tid prn  
  
 TYLENOL SINUS PO Take  by mouth. * Notice: This list has 3 medication(s) that are the same as other medications prescribed for you. Read the directions carefully, and ask your doctor or other care provider to review them with you. Prescriptions Sent to Pharmacy Refills nystatin (MYCOSTATIN) powder 0 Sig: aaa tid prn Class: Normal  
 Pharmacy: [facility] [address] [place] [address], [address] AT [address] [place] [address] Ph #: [phone]  
 losartan-hydroCHLOROthiazide (HYZAAR) 50-12.5 mg per tablet 1 Sig: Take 1 Tab by mouth daily. Class: Normal  
 Pharmacy: [facility] [address] [place] [address] Ph #: [phone] Route: Oral  
 nitrofurantoin, macrocrystal-monohydrate, (MACROBID) 100 mg capsule 0 Sig: Take 1 Cap by mouth two (2) times a day for 5 days. Class: Normal  
 Pharmacy: [facility] [address] Ph #: [phone] Route: Oral  
 chlorhexidine (HIBICLENS) 4 % liquid 1 Sig: 3 times per week Class: Normal  
 Pharmacy: [facility] [address] Ph #: [phone] We Performed the Following HEMOGLOBIN A1C WITH EAG [25640 CPT(R)] METABOLIC PANEL, COMPREHENSIVE [72156 CPT(R)] REFERRAL TO GASTROENTEROLOGY [ZIT24 Custom] Follow-up Instructions Return in about 3 months (around 7/17/2018). To-Do List   
 04/17/2018 Imaging:  DEXA BONE DENSITY STUDY AXIAL   
  
 04/17/2018 Imaging:  CYNDI 3D TRESSA W MAMMO BI SCREENING INCL CAD Referral Information Referral ID Referred By Referred To  
  
 [id number] [facility] Not Available Visits Status Start Date End Date 1 New Request 4/17/18 4/17/19 If your referral has a status of pending review or denied, additional information will be sent to support the outcome of this decision. Referral ID Referred By Referred To  
 [id number] Hillary Gold[facility] [address] [place], [facility] Visits Status Start Date End Date 1 New Request 4/17/18 4/17/19 If your referral has a status of pending review or denied, additional information will be sent to support the outcome of this decision. Patient Instructions Follow up cystoscopy with urology for blood in urine Get lung ct scan for screen, mammogram , dxa, pelvic exam, colonoscopy Due for labs today Change to losartan -hct for blood pressure Introducing [facility]! Dear Gurpreet Lyles: 
Thank you for requesting a [facility] account. Our records indicate that you already have an active [facility] account. You can access your account anytime at [email or web address] Did you know that you can access your hospital and ER discharge instructions at any time in [facility]? You can also review all of your test results from your hospital stay or ER visit. Additional Information If you have questions, please visit the Frequently Asked Questions section of the [employer] website at [email or web address] Remember, [facility] is NOT to be used for urgent needs. For medical emergencies, dial 911. Now available from your iPhone and Android! Please provide this summary of care documentation to your next provider. Your primary care clinician is listed as Yakov Kraft. If you have any questions after today's visit, please call [phone].

## 2018-04-17 NOTE — PATIENT INSTRUCTIONS
Follow up cystoscopy with urology for blood in urine    Get lung ct scan for screen, mammogram , dxa, pelvic exam, colonoscopy    Due for labs today     Change to losartan -hct for blood pressure

## 2018-04-17 NOTE — PROGRESS NOTES
Last here 17. Pt is here to f/u on cpe         Wt is down 7 lbs since last visit   Congratulated pt on this feat    Discussed diet and weight loss     Reviewed labs    Will get labs today         Pt c/o \"puffiness\" to L side of neck  Discussed her sx being indicative of a supraclavicular fat pad             Pt quit smoking last year  Encouraged continued cessation  Discussed CT lung screen   She has yet to complete this    ACP not on file. SDM is her . Provided information today.      PREVENTIVE:    Colonoscopy: 08, due in , [place], due, ordered   Pap: Dr. Martinez Pap, 2012, every other year, due, will schedule, reminded, h/o hysterectomy and BSO in her 's for endometriosis   Mammogram: 1/15, still due, reminded again to schedule, ordered, reordered    Dexa: due, declines for now, wants to wait till age 63, ordered   Tdap: 2012  Pneumovax: not yet needed  Shingrix: will get at f/u   Flu shot: 2017 at work  Eye exam: 10/17, [employer], will get notes for review  A1c:  5.8,  5.8,  5.4  Lipids:  LDL 98  Hep C Screenin/24/15, negative  EK/17, nsr     Patient Active Problem List    Diagnosis Date Noted    Depression 2012    Anxiety 2012    Hyperlipidemia 2012    HTN (hypertension) 2012    Microscopic hematuria 2012    Fatty liver 2012    JUAN ANTONIO on CPAP 2012     Current Outpatient Prescriptions   Medication Sig Dispense Refill    PSEUDOEPHEDRINE/ACETAMINOPHEN (TYLENOL SINUS PO) Take  by mouth.       ALPRAZolam (XANAX) 0.25 mg tablet TAKE 1 TABLET BY MOUTH EVERY EVENING AS NEEDED 30 Tab 0    FLUoxetine (PROZAC) 40 mg capsule TAKE 1 CAPSULE BY MOUTH DAILY 90 Cap 0    atorvastatin (LIPITOR) 80 mg tablet TAKE 1 TABLET BY MOUTH NIGHTLY 90 Tab 0    losartan (COZAAR) 50 mg tablet TAKE 1 TABLET BY MOUTH DAILY 30 Tab 6    atorvastatin (LIPITOR) 80 mg tablet TAKE 1 TABLET BY MOUTH NIGHTLY 90 Tab 1    albuterol (PROAIR HFA) 90 mcg/actuation inhaler Take 2 Puffs by inhalation every four (4) hours as needed for Wheezing. 1 Inhaler 3    losartan (COZAAR) 50 mg tablet Take 1 Tab by mouth daily. 30 Tab 0    atorvastatin (LIPITOR) 80 mg tablet TAKE 1 TABLET BY MOUTH NIGHTLY 90 Tab 0    nystatin (MYCOSTATIN) powder aaa tid prn 60 g 0     Past Surgical History:   Procedure Laterality Date    HX HYSTERECTOMY      HX ORTHOPAEDIC      right foot surgery    HX TONSILLECTOMY        Lab Results  Component Value Date/Time   WBC 6.2 06/19/2017 12:00 AM   HGB 13.7 06/19/2017 12:00 AM   HCT 41.5 06/19/2017 12:00 AM   PLATELET 017 48/55/6458 12:00 AM   MCV 93 06/19/2017 12:00 AM     Lab Results  Component Value Date/Time   Cholesterol, total 212 (H) 06/19/2017 12:00 AM   HDL Cholesterol 93 06/19/2017 12:00 AM   LDL, calculated 98 06/19/2017 12:00 AM   Triglyceride 106 06/19/2017 12:00 AM     Lab Results  Component Value Date/Time   GFR est non-AA 95 06/19/2017 12:00 AM   GFR est  06/19/2017 12:00 AM   Creatinine 0.70 06/19/2017 12:00 AM   BUN 10 06/19/2017 12:00 AM   Sodium 141 06/19/2017 12:00 AM   Potassium 4.5 06/19/2017 12:00 AM   Chloride 99 06/19/2017 12:00 AM   CO2 25 06/19/2017 12:00 AM        Review of Systems   Constitutional: Negative for chills and fever. Respiratory: Negative for shortness of breath. Cardiovascular: Negative for chest pain. Gastrointestinal: Negative for nausea and vomiting. Genitourinary: Positive for urgency. Negative for dysuria. Skin: Positive for itching and rash. Physical Exam   Constitutional: She is oriented to person, place, and time. She appears well-developed and well-nourished. No distress. HENT:   Head: Normocephalic and atraumatic. Right Ear: External ear normal.   Left Ear: External ear normal.   Mouth/Throat: Oropharynx is clear and moist. No oropharyngeal exudate. Eyes: Conjunctivae and EOM are normal. Pupils are equal, round, and reactive to light.  Right eye exhibits no discharge. Left eye exhibits no discharge. No scleral icterus. Neck: Normal range of motion. Neck supple. No carotid bruits    Cardiovascular: Normal rate, regular rhythm, normal heart sounds and intact distal pulses. Exam reveals no gallop and no friction rub. No murmur heard. Pulmonary/Chest: Effort normal and breath sounds normal. No respiratory distress. She has no wheezes. She has no rales. She exhibits no tenderness. Abdominal: Soft. She exhibits no distension and no mass. There is no tenderness. There is no rebound and no guarding. Musculoskeletal: Normal range of motion. She exhibits tenderness (Mild L CVA). She exhibits no edema or deformity. Lymphadenopathy:     She has no cervical adenopathy. Neurological: She is alert and oriented to person, place, and time. Coordination normal.   Skin: Skin is warm and dry. No rash noted. She is not diaphoretic. No erythema. No pallor. Psychiatric: She has a normal mood and affect. Her behavior is normal.       ASSESSMENT and PLAN    ICD-10-CM ICD-9-CM      I10 401.9      R31.29 599.72      E78.00 272.0      F32.9 300.4      G47.33 327.23     Z99.89 V46.8      N30.01 595.0    1. Physical exam    Discussed diet and w/l, wt improved x lov, pt is no longer smoking, congratulated her, needs to complete CT lung cancer screen, COLO, mammo, DEXA, pelvic exam, blood work ordered, flu shot completed at work in the Fall, eye exam UTD   C59.58 B14.0 METABOLIC PANEL, COMPREHENSIVE   Current diagnosis and concerns discussed with pt at length. Understands risks and benefits or current treatment plan and medications and accepts the treatment and medication with any possible risks.   Pt asks appropriate questions which were answered.   Pt instructed to call with any concerns or problems.  This note will not be viewable in MyChart.

## 2018-04-18 LAB
ALBUMIN SERPL-MCNC: 4.4 G/DL (ref 3.6–4.8)
ALBUMIN/GLOB SERPL: 1.9 {RATIO} (ref 1.2–2.2)
ALP SERPL-CCNC: 104 IU/L (ref 39–117)
ALT SERPL-CCNC: 15 IU/L (ref 0–32)
AST SERPL-CCNC: 19 IU/L (ref 0–40)
BACTERIA UR CULT: NORMAL
BILIRUB SERPL-MCNC: 0.3 MG/DL (ref 0–1.2)
BUN SERPL-MCNC: 15 MG/DL (ref 8–27)
BUN/CREAT SERPL: 20 (ref 12–28)
CALCIUM SERPL-MCNC: 9 MG/DL (ref 8.7–10.3)
CHLORIDE SERPL-SCNC: 102 MMOL/L (ref 96–106)
CO2 SERPL-SCNC: 21 MMOL/L (ref 18–29)
CREAT SERPL-MCNC: 0.74 MG/DL (ref 0.57–1)
EST. AVERAGE GLUCOSE BLD GHB EST-MCNC: 108 MG/DL
GFR SERPLBLD CREATININE-BSD FMLA CKD-EPI: 102 ML/MIN/1.73
GFR SERPLBLD CREATININE-BSD FMLA CKD-EPI: 88 ML/MIN/1.73
GLOBULIN SER CALC-MCNC: 2.3 G/DL (ref 1.5–4.5)
GLUCOSE SERPL-MCNC: 89 MG/DL (ref 65–99)
HBA1C MFR BLD: 5.4 % (ref 4.8–5.6)
POTASSIUM SERPL-SCNC: 4.5 MMOL/L (ref 3.5–5.2)
PROT SERPL-MCNC: 6.7 G/DL (ref 6–8.5)
SODIUM SERPL-SCNC: 140 MMOL/L (ref 134–144)

## 2018-07-17 RX ORDER — FLUOXETINE HYDROCHLORIDE 40 MG/1
CAPSULE ORAL
Qty: 90 CAP | Refills: 0 | Status: SHIPPED | OUTPATIENT
Start: 2018-07-17 | End: 2018-12-14 | Stop reason: SDUPTHER

## 2018-07-30 ENCOUNTER — OFFICE VISIT (OUTPATIENT)
Dept: INTERNAL MEDICINE CLINIC | Age: 63
End: 2018-07-30

## 2018-07-30 VITALS
RESPIRATION RATE: 16 BRPM | BODY MASS INDEX: 27.32 KG/M2 | DIASTOLIC BLOOD PRESSURE: 88 MMHG | SYSTOLIC BLOOD PRESSURE: 142 MMHG | HEART RATE: 85 BPM | OXYGEN SATURATION: 98 % | WEIGHT: 164 LBS | TEMPERATURE: 98.1 F | HEIGHT: 65 IN

## 2018-07-30 DIAGNOSIS — Z99.89 OSA ON CPAP: ICD-10-CM

## 2018-07-30 DIAGNOSIS — I10 ESSENTIAL HYPERTENSION: Primary | ICD-10-CM

## 2018-07-30 DIAGNOSIS — G47.33 OSA ON CPAP: ICD-10-CM

## 2018-07-30 DIAGNOSIS — E78.00 PURE HYPERCHOLESTEROLEMIA: ICD-10-CM

## 2018-07-30 DIAGNOSIS — K76.0 FATTY LIVER: ICD-10-CM

## 2018-07-30 DIAGNOSIS — F32.9 REACTIVE DEPRESSION: ICD-10-CM

## 2018-07-30 DIAGNOSIS — R42 DIZZINESS: ICD-10-CM

## 2018-07-30 DIAGNOSIS — Z12.11 COLON CANCER SCREENING: ICD-10-CM

## 2018-07-30 DIAGNOSIS — R31.29 MICROSCOPIC HEMATURIA: ICD-10-CM

## 2018-07-30 DIAGNOSIS — G44.229 CHRONIC TENSION-TYPE HEADACHE, NOT INTRACTABLE: ICD-10-CM

## 2018-07-30 RX ORDER — BUPROPION HYDROCHLORIDE 150 MG/1
150 TABLET ORAL
Qty: 30 TAB | Refills: 3 | Status: SHIPPED | OUTPATIENT
Start: 2018-07-30 | End: 2018-12-14 | Stop reason: SDUPTHER

## 2018-07-30 RX ORDER — FLUOXETINE 20 MG/1
20 TABLET ORAL DAILY
Qty: 30 TAB | Refills: 2 | Status: SHIPPED | OUTPATIENT
Start: 2018-07-30 | End: 2019-09-04 | Stop reason: SDUPTHER

## 2018-07-30 RX ORDER — HYDROCHLOROTHIAZIDE 25 MG/1
25 TABLET ORAL DAILY
Qty: 90 TAB | Refills: 1 | Status: SHIPPED | OUTPATIENT
Start: 2018-07-30 | End: 2019-09-11 | Stop reason: SDUPTHER

## 2018-07-30 RX ORDER — LOSARTAN POTASSIUM 50 MG/1
50 TABLET ORAL DAILY
Qty: 90 TAB | Refills: 1 | Status: SHIPPED | OUTPATIENT
Start: 2018-07-30 | End: 2019-09-11 | Stop reason: SDUPTHER

## 2018-07-30 NOTE — PATIENT INSTRUCTIONS
Decrease prozac to every other day and change to 20mg tablet when it runs out Start wellbutrin 150mg daily

## 2018-07-30 NOTE — MR AVS SNAPSHOT
[address] [facility] 
[phone] Patient: Rodney Zurita MRN: [id number] GUR:[id number]/36/7411 Visit Information Date & Time Provider Department Dept. Phone Encounter #  
 7/30/2018  8:30 AM Armida Newman, [address] [phone] [id number] Follow-up Instructions Return in about 3 months (around 10/30/2018). Upcoming Health Maintenance Date Due  
 PAP AKA CERVICAL CYTOLOGY 9/12/2015 BREAST CANCER SCRN MAMMOGRAM 1/3/2017 ZOSTER VACCINE AGE 60> 7/24/2017 COLONOSCOPY 2/8/2018 Influenza Age 5 to Adult 8/1/2018 DTaP/Tdap/Td series (2 - Td) 9/21/2022 Allergies as of 7/30/2018  Review Complete On: 7/30/2018 By: Beverley Moreira LPN No Known Allergies Current Immunizations  Reviewed on 4/17/2018 Name Date Influenza Vaccine 10/17/2017, 11/8/2014, 10/15/2013 Influenza Vaccine (Quad) PF 11/18/2016, 11/30/2015 TDAP Vaccine 9/21/2012 Not reviewed this visit You Were Diagnosed With   
  
 Codes Comments Essential hypertension    -  Primary ICD-10-CM: I10 
ICD-9-CM: 401.9 Pure hypercholesterolemia     ICD-10-CM: E78.00 ICD-9-CM: 272.0 Reactive depression     ICD-10-CM: F32.9 ICD-9-CM: 300.4 JUAN ANTONIO on CPAP     ICD-10-CM: G47.33, Z99.89 ICD-9-CM: 327.23, V46.8 Fatty liver     ICD-10-CM: K76.0 ICD-9-CM: 571.8 Colon cancer screening     ICD-10-CM: Z12.11 ICD-9-CM: V76.51 Microscopic hematuria     ICD-10-CM: R31.29 ICD-9-CM: 599.72 Dizziness     ICD-10-CM: U53 ICD-9-CM: 780. 4 Chronic tension-type headache, not intractable     ICD-10-CM: D17.247 ICD-9-CM: 339.12 Vitals BP Pulse Temp Resp Height(growth percentile) Weight(growth percentile) 142/88 (BP 1 Location: Left arm, BP Patient Position: Sitting) 85 98.1 °F (36.7 °C) (Oral) 16 5' 5\" (1.651 m) 164 lb (74.4 kg) SpO2 BMI OB Status Smoking Status 98% 27.29 kg/m2 Hysterectomy Former Smoker Vitals History BMI and BSA Data Body Mass Index Body Surface Area  
 27.29 kg/m 2 1.85 m 2 Preferred Pharmacy Pharmacy Name Phone [facility] [phone], [address] AT [place] [address] [phone] Your Updated Medication List  
  
   
This list is accurate as of 7/30/18  8:45 AM.  Always use your most recent med list.  
  
  
  
  
 albuterol 90 mcg/actuation inhaler Commonly known as:  PROAIR HFA Take 2 Puffs by inhalation every four (4) hours as needed for Wheezing. ALPRAZolam 0.25 mg tablet Commonly known as:  XANAX  
TAKE 1 TABLET BY MOUTH EVERY EVENING AS NEEDED  
  
 atorvastatin 80 mg tablet Commonly known as:  LIPITOR  
TAKE 1 TABLET BY MOUTH NIGHTLY buPROPion  mg tablet Commonly known as:  Darcy Plume Take 1 Tab by mouth every morning. * FLUoxetine 40 mg capsule Commonly known as:  PROzac TAKE 1 CAPSULE BY MOUTH EVERY DAY  
  
 * FLUoxetine 20 mg tablet Commonly known as:  PROzac Take 1 Tab by mouth daily. losartan-hydroCHLOROthiazide 50-12.5 mg per tablet Commonly known as:  HYZAAR Take 1 Tab by mouth daily. nystatin powder Commonly known as:  MYCOSTATIN  
aaa tid prn  
  
 TYLENOL SINUS PO Take  by mouth.  
  
 varicella-zoster recombinant (PF) 50 mcg/0.5 mL Susr injection Commonly known as:  SHINGRIX (PF)  
0.5 mL by IntraMUSCular route once for 1 dose. * Notice: This list has 2 medication(s) that are the same as other medications prescribed for you. Read the directions carefully, and ask your doctor or other care provider to review them with you. Prescriptions Printed Refills FLUoxetine (PROZAC) 20 mg tablet 2 Sig: Take 1 Tab by mouth daily. Class: Print  Route: Oral  
 varicella-zoster recombinant, PF, (SHINGRIX, PF,) 50 mcg/0.5 mL susr injection 1  
 Si.5 mL by IntraMUSCular route once for 1 dose. Class: Print Route: IntraMUSCular Prescriptions Sent to Pharmacy Refills buPROPion XL (WELLBUTRIN XL) 150 mg tablet 3 Sig: Take 1 Tab by mouth every morning. Class: Normal  
 Pharmacy: [facility] [address] [place] [address]  [facility] Ph #: [phone] Route: Oral  
  
We Performed the Following AMB POC EKG ROUTINE W/ 12 LEADS, INTER & REP [43359 CPT(R)] CBC W/O DIFF [30897 CPT(R)] HEMOGLOBIN A1C WITH EAG [00528 CPT(R)] LIPID PANEL [40791 CPT(R)] METABOLIC PANEL, COMPREHENSIVE [67104 CPT(R)] OCCULT BLOOD IMMUNOASSAY,DIAGNOSTIC [29442 CPT(R)] TSH 3RD GENERATION [39789 CPT(R)] Follow-up Instructions Return in about 3 months (around 10/30/2018). To-Do List   
 2018 Imaging:  CT HEAD WO CONT Referral Information Referral ID Referred By Referred To  
  
 [id number] Lucy Mayfield Not Available Visits Status Start Date End Date 1 New Request 18 If your referral has a status of pending review or denied, additional information will be sent to support the outcome of this decision. Patient Instructions Decrease prozac to every other day and change to 20mg tablet when it runs out Start wellbutrin 150mg daily Introducing hospitals & HEALTH SERVICES! [facility]: 
Thank you for requesting a Tutor account. Our records indicate that you already have an active Tutor account. You can access your account anytime at [email or web address]. [employer] Did you know that you can access your hospital and ER discharge instructions at any time in Tutor? You can also review all of your test results from your hospital stay or ER visit. Additional Information If you have questions, please visit the Frequently Asked Questions section of the [employer] website at [email or web address] Remember, [employer] is NOT to be used for urgent needs. For medical emergencies, dial 911. Now available from your iPhone and Android! Please provide this summary of care documentation to your next provider. Your primary care clinician is listed as Lai Medina. If you have any questions after today's visit, please call [phone].

## 2018-07-30 NOTE — PROGRESS NOTES
HISTORY OF PRESENT ILLNESS Makayla Mims is a 63 y.o. female. HPI Last here 4/17/18. Pt is here to f/u on cpe 
   
BP today is 162/96, will repeat this today--still high BP is 130/85, 124/79 at home no low readings does not check much Lov, changed losartan to losartan-HCTZ 50-12.5mg daily - reports compliance Pt took this med this morning Will increase this losartan 50mg and HCTZ 25mg Wt is down 7 lbs since last visit, 10 lbs since last year Congratulated pt on this feat Pt states she walks a lot at work Discussed diet and weight loss 
  
Reviewed labs 4/18 Will get labs today  
  
Pt states she is lightheaded and shaky today She states these sx happen off and on, not constantly, x 6 months She sometimes has to go lie down and put a cold rag on her head, which helps Sx last about an hour Pt had a BP of 126/84 once during one of these episodes Lately she has had these sx 2-3 times per week Ordered EKG Pt also c/o headache x 1 month across the top of her head Not taking anything for this No prior h/o ha, not improving Ordered head CT 
  
Lov, pt c/o sores all around her body Pt saw Dr. Pamela Hardy (derm) for her sx - will get notes for review Pt has not seen this physician recently 
  
Pt has seen Dr. Kelly Leon (uro-gyn) for recurrent hematuria Reviewed notes 6/4/18: planning cystoscopy and CT Per pt, a lesion was found in her bladder, had a FISH test and a CT scan  Reports all normal in hte end Pt went to [facility] for CT scan, per pt this was nl Per pt, she discussed having urodynamics test and possible bulking procedure for leakage However pt says she was going to hold off on this for now d/t costs 
  
Continues lipitor 80mg daily for cholesterol  
  
Continues prozac to 40mg daily for depression, which she thinks is not working as well now Feeling sad and down more than note Pt says she sometimes has tougher days Will decrease prozac to 20mg, and add wellbutrin 150mg daily Pt has xanax to use prn (once weekly or less) as well, works well  
   
Pt is noncompliant with CPAP qhs for JUAN ANTONIO Lov, advised her to f/u with Dr. Vito Hart (sleep) for CPAP  
No interested in f/u currently  
    
Pt has continued to smoke Discussed cessation Lov, discussed CT lung screen, she has not yet completed this 
  
ACP not on file. SDM is her . Provided information today.  
   
PREVENTIVE:   
Colonoscopy: 08, due in , [place], due, ordered, reminded, ordered FIT test 18 Pap: Dr. Jaylen Celis , h/o hysterectomy and BSO in her 's for endometriosis Mammogram: 1/15, still due, reminded again to schedule, ordered, reordered, reminded Dexa: due, declines for now, wants to wait till age 63, ordered, reminded Tdap: 2012 Pneumovax: not yet needed Shingrix: ordered 18 Flu shot: 2017 at work Eye exam: 10/17, [employer], will get notes for review A1c:  5.8,  5.8,  5.4,  5.4 Lipids:  LDL 98 Hep C Screenin/24/15, negative EK18 nsr Patient Active Problem List  
 Diagnosis Date Noted  Depression 2012  Anxiety 2012  Hyperlipidemia 2012  
 HTN (hypertension) 2012  Microscopic hematuria 2012  Fatty liver 2012  JUAN ANTONIO on CPAP 2012 Current Outpatient Prescriptions Medication Sig Dispense Refill  FLUoxetine (PROZAC) 40 mg capsule TAKE 1 CAPSULE BY MOUTH EVERY DAY 90 Cap 0  
 nystatin (MYCOSTATIN) powder aaa tid prn 60 g 0  
 PSEUDOEPHEDRINE/ACETAMINOPHEN (TYLENOL SINUS PO) Take  by mouth.  losartan-hydroCHLOROthiazide (HYZAAR) 50-12.5 mg per tablet Take 1 Tab by mouth daily. 90 Tab 1  
 atorvastatin (LIPITOR) 80 mg tablet TAKE 1 TABLET BY MOUTH NIGHTLY 90 Tab 0  
 atorvastatin (LIPITOR) 80 mg tablet TAKE 1 TABLET BY MOUTH NIGHTLY 90 Tab 1  
 atorvastatin (LIPITOR) 80 mg tablet TAKE 1 TABLET BY MOUTH NIGHTLY 90 Tab 0  chlorhexidine (HIBICLENS) 4 % liquid 3 times per week 1 Bottle 1  
 hydroCHLOROthiazide (HYDRODIURIL) 12.5 mg tablet Take 1 Tab by mouth daily. 30 Tab 0  ALPRAZolam (XANAX) 0.25 mg tablet TAKE 1 TABLET BY MOUTH EVERY EVENING AS NEEDED 30 Tab 0  
 albuterol (PROAIR HFA) 90 mcg/actuation inhaler Take 2 Puffs by inhalation every four (4) hours as needed for Wheezing. 1 Inhaler 3 Past Surgical History:  
Procedure Laterality Date  HX HYSTERECTOMY  HX ORTHOPAEDIC    
 right foot surgery  HX TONSILLECTOMY Lab Results Component Value Date/Time WBC 6.2 06/19/2017 12:00 AM  
HGB 13.7 06/19/2017 12:00 AM  
HCT 41.5 06/19/2017 12:00 AM  
PLATELET 963 65/15/9530 12:00 AM  
MCV 93 06/19/2017 12:00 AM  
 
Lab Results Component Value Date/Time Cholesterol, total 212 (H) 06/19/2017 12:00 AM  
HDL Cholesterol 93 06/19/2017 12:00 AM  
LDL, calculated 98 06/19/2017 12:00 AM  
Triglyceride 106 06/19/2017 12:00 AM  
 
Lab Results Component Value Date/Time GFR est non-AA 88 04/17/2018 12:36 PM  
GFR est  04/17/2018 12:36 PM  
Creatinine 0.74 04/17/2018 12:36 PM  
BUN 15 04/17/2018 12:36 PM  
Sodium 140 04/17/2018 12:36 PM  
Potassium 4.5 04/17/2018 12:36 PM  
Chloride 102 04/17/2018 12:36 PM  
CO2 21 04/17/2018 12:36 PM  
  
 
Review of Systems Respiratory: Negative for shortness of breath. Cardiovascular: Negative for chest pain. Neurological: Positive for dizziness and headaches. Physical Exam  
Constitutional: She is oriented to person, place, and time. She appears well-developed and well-nourished. No distress. HENT:  
Head: Normocephalic and atraumatic. Mouth/Throat: Oropharynx is clear and moist. No oropharyngeal exudate. Eyes: Conjunctivae and EOM are normal. Pupils are equal, round, and reactive to light. Right eye exhibits no discharge. Left eye exhibits no discharge. No scleral icterus. Neck: Normal range of motion. Neck supple. No carotid bruits Cardiovascular: Normal rate, regular rhythm, normal heart sounds and intact distal pulses. Exam reveals no gallop and no friction rub. No murmur heard. Pulmonary/Chest: Effort normal and breath sounds normal. No respiratory distress. She has no wheezes. She has no rales. She exhibits no tenderness. Abdominal: Soft. She exhibits no distension. There is no tenderness. There is no rebound and no guarding. Musculoskeletal: Normal range of motion. She exhibits no edema, tenderness or deformity. 5/5 strength BLUE Lymphadenopathy:  
  She has no cervical adenopathy. Neurological: She is alert and oriented to person, place, and time. Coordination normal.  
Cranial nerves 2-12 grossly intact Finger to nose normal BL Rapid alternating hand movements normal BL Skin: Skin is warm and dry. No rash noted. She is not diaphoretic. No erythema. No pallor. Psychiatric: She has a normal mood and affect. Her behavior is normal.  
 
 
ASSESSMENT and PLAN High Complex Medical Decision Making ICD-10-CM ICD-9-CM 1. Essential hypertension Not well controlled still, pt reports some dizziness but not orthostatic, lightheaded but pressures always high when she has these sx and elevated pressures may be cause, currently on losartan HCT 50/12.5, will increase this to 50 losartan and 25 of HCT I10 401.9 LIPID PANEL  
   METABOLIC PANEL, COMPREHENSIVE  
   CBC W/O DIFF  
   TSH 3RD GENERATION  
   HEMOGLOBIN A1C WITH EAG  
   AMB POC EKG ROUTINE W/ 12 LEADS, INTER & REP 2. Pure hypercholesterolemia Due for repeat lipids, on lipitor 80mg, max dose E78.00 272.0 LIPID PANEL  
   METABOLIC PANEL, COMPREHENSIVE  
   CBC W/O DIFF  
   TSH 3RD GENERATION  
   HEMOGLOBIN A1C WITH EAG 3. Reactive depression Progressively worsening, will decrease prozac from 40 to 20mg, will start wellbutrin to 150mg, at f/u may taper off prozac and increase wellbutrin further if this is working  F32.9 300.4 LIPID PANEL  
   METABOLIC PANEL, COMPREHENSIVE  
   CBC W/O DIFF  
   TSH 3RD GENERATION  
   HEMOGLOBIN A1C WITH EAG  
4. JUAN ANTONIO on CPAP Not interested in CPAP or following up with sleep specialist 
 G47.33 327.23 LIPID PANEL  
 X16.91 I47.6 METABOLIC PANEL, COMPREHENSIVE  
   CBC W/O DIFF  
   TSH 3RD GENERATION  
   HEMOGLOBIN A1C WITH EAG  
5. Fatty liver Working on w/l, check LFTs K76.0 571.8 LIPID PANEL  
   METABOLIC PANEL, COMPREHENSIVE  
   CBC W/O DIFF  
   TSH 3RD GENERATION  
   HEMOGLOBIN A1C WITH EAG 6. Colon cancer screening Screen Z12.11 V76.51 OCCULT BLOOD IMMUNOASSAY,DIAGNOSTIC  
   LIPID PANEL  
   METABOLIC PANEL, COMPREHENSIVE  
   CBC W/O DIFF  
   TSH 3RD GENERATION  
   HEMOGLOBIN A1C WITH EAG  
7. Microscopic hematuria Completed cystoscopy and CT [facility] for eval with Samina Sanchez, negative eval in the end, will get CT scan for review R31.29 599.72   
8. Dizziness EKG nsr, will check basic labs, pt has new onset HA in addition to dizziness, continues to smoke as well, will get head CT, adjust BP meds in case this is playing a role, eval further at f/u, may need echo and carotid US as well R42 780.4 AMB POC EKG ROUTINE W/ 12 LEADS, INTER & REP  
   CT HEAD WO CONT 9. Chronic tension-type headache, not intractable See above G44.229 339.12 CT HEAD WO CONT Scribed by Dustin Wu of [address] [place] [address] [address], as dictated by Dr. Rasheeda Valdez. Current diagnosis and concerns discussed with pt at length. Pt understands risks and benefits or current treatment plan and medications, and accepts the treatment and medication with any possible risks. Pt asks appropriate questions, which were answered. Pt was instructed to call with any concerns or problems. This note will not be viewable in [address]

## 2018-07-31 LAB
ALBUMIN SERPL-MCNC: 4.6 G/DL (ref 3.6–4.8)
ALBUMIN/GLOB SERPL: 1.9 {RATIO} (ref 1.2–2.2)
ALP SERPL-CCNC: 97 IU/L (ref 39–117)
ALT SERPL-CCNC: 17 IU/L (ref 0–32)
AST SERPL-CCNC: 14 IU/L (ref 0–40)
BILIRUB SERPL-MCNC: 0.6 MG/DL (ref 0–1.2)
BUN SERPL-MCNC: 13 MG/DL (ref 8–27)
BUN/CREAT SERPL: 19 (ref 12–28)
CALCIUM SERPL-MCNC: 9.5 MG/DL (ref 8.7–10.3)
CHLORIDE SERPL-SCNC: 100 MMOL/L (ref 96–106)
CHOLEST SERPL-MCNC: 228 MG/DL (ref 100–199)
CO2 SERPL-SCNC: 22 MMOL/L (ref 20–29)
CREAT SERPL-MCNC: 0.67 MG/DL (ref 0.57–1)
ERYTHROCYTE [DISTWIDTH] IN BLOOD BY AUTOMATED COUNT: 13.1 % (ref 12.3–15.4)
EST. AVERAGE GLUCOSE BLD GHB EST-MCNC: 111 MG/DL
GLOBULIN SER CALC-MCNC: 2.4 G/DL (ref 1.5–4.5)
GLUCOSE SERPL-MCNC: 101 MG/DL (ref 65–99)
HBA1C MFR BLD: 5.5 % (ref 4.8–5.6)
HCT VFR BLD AUTO: 44.3 % (ref 34–46.6)
HDLC SERPL-MCNC: 72 MG/DL
HGB BLD-MCNC: 14.9 G/DL (ref 11.1–15.9)
LDLC SERPL CALC-MCNC: 129 MG/DL (ref 0–99)
MCH RBC QN AUTO: 31.3 PG (ref 26.6–33)
MCHC RBC AUTO-ENTMCNC: 33.6 G/DL (ref 31.5–35.7)
MCV RBC AUTO: 93 FL (ref 79–97)
PLATELET # BLD AUTO: 300 X10E3/UL (ref 150–379)
POTASSIUM SERPL-SCNC: 3.9 MMOL/L (ref 3.5–5.2)
PROT SERPL-MCNC: 7 G/DL (ref 6–8.5)
RBC # BLD AUTO: 4.76 X10E6/UL (ref 3.77–5.28)
SODIUM SERPL-SCNC: 141 MMOL/L (ref 134–144)
TRIGL SERPL-MCNC: 134 MG/DL (ref 0–149)
TSH SERPL DL<=0.005 MIU/L-ACNC: 1.29 UIU/ML (ref 0.45–4.5)
VLDLC SERPL CALC-MCNC: 27 MG/DL (ref 5–40)
WBC # BLD AUTO: 6.8 X10E3/UL (ref 3.4–10.8)

## 2018-09-09 RX ORDER — ATORVASTATIN CALCIUM 80 MG/1
TABLET, FILM COATED ORAL
Qty: 90 TAB | Refills: 0 | Status: SHIPPED | OUTPATIENT
Start: 2018-09-09 | End: 2019-04-09 | Stop reason: SDUPTHER

## 2018-11-17 DIAGNOSIS — F41.9 ANXIETY: ICD-10-CM

## 2018-11-17 RX ORDER — ALPRAZOLAM 0.25 MG/1
TABLET ORAL
Qty: 15 TAB | Refills: 0 | Status: SHIPPED | OUTPATIENT
Start: 2018-11-17 | End: 2019-02-12 | Stop reason: SDUPTHER

## 2018-11-20 NOTE — TELEPHONE ENCOUNTER
Following rx was faxed to pharmacy with confirmation received:      ALPRAZolam (XANAX) 0.25 mg tablet [[id number]]   Order Details   Dose, Route, Frequency: As Directed    Dispense Quantity: 15 Tab Refills: 0 Fills remaining: --           Sig: TAKE 1 TABLET BY MOUTH EVERY EVENING AS NEEDED          Written Date: 11/17/18 Expiration Date: --     Start Date: 11/17/18 End Date: --

## 2018-12-14 RX ORDER — FLUOXETINE HYDROCHLORIDE 40 MG/1
CAPSULE ORAL
Qty: 90 CAP | Refills: 0 | Status: SHIPPED | OUTPATIENT
Start: 2018-12-14 | End: 2019-04-09 | Stop reason: SDUPTHER

## 2018-12-14 RX ORDER — BUPROPION HYDROCHLORIDE 150 MG/1
TABLET ORAL
Qty: 30 TAB | Refills: 0 | Status: SHIPPED | OUTPATIENT
Start: 2018-12-14 | End: 2019-04-18 | Stop reason: SDUPTHER

## 2019-01-15 ENCOUNTER — OFFICE VISIT (OUTPATIENT)
Dept: INTERNAL MEDICINE CLINIC | Age: 64
End: 2019-01-15

## 2019-01-15 VITALS
HEART RATE: 83 BPM | BODY MASS INDEX: 27.66 KG/M2 | WEIGHT: 166 LBS | DIASTOLIC BLOOD PRESSURE: 76 MMHG | OXYGEN SATURATION: 97 % | TEMPERATURE: 97.8 F | SYSTOLIC BLOOD PRESSURE: 134 MMHG | HEIGHT: 65 IN | RESPIRATION RATE: 16 BRPM

## 2019-01-15 DIAGNOSIS — F32.9 REACTIVE DEPRESSION: ICD-10-CM

## 2019-01-15 DIAGNOSIS — J06.9 URI, ACUTE: ICD-10-CM

## 2019-01-15 DIAGNOSIS — Z00.00 PHYSICAL EXAM, ANNUAL: Primary | ICD-10-CM

## 2019-01-15 DIAGNOSIS — I10 ESSENTIAL HYPERTENSION: ICD-10-CM

## 2019-01-15 DIAGNOSIS — Z99.89 OSA ON CPAP: ICD-10-CM

## 2019-01-15 DIAGNOSIS — G47.33 OSA ON CPAP: ICD-10-CM

## 2019-01-15 DIAGNOSIS — Z23 ENCOUNTER FOR IMMUNIZATION: ICD-10-CM

## 2019-01-15 DIAGNOSIS — F41.9 ANXIETY: ICD-10-CM

## 2019-01-15 DIAGNOSIS — E78.00 PURE HYPERCHOLESTEROLEMIA: ICD-10-CM

## 2019-01-15 DIAGNOSIS — R01.1 CARDIAC MURMUR: ICD-10-CM

## 2019-01-15 NOTE — PROGRESS NOTES
HISTORY OF PRESENT ILLNESS Aletha Tracy is a 66 y.o. female. HPI Last here 7/30/18. Pt is here to f/u on cpe 
   
BP today is 149/89, will repeat this today - 134/76 BP around 140/80 at home--actually checks at work when she is rushing Discussed checking bp after resting 10 minutes Lov, changed losartan-HCTZ 50-12.5 to losartan 50mg and HCTZ 25mg Doing well with this 
  
Wt today is 166 lbs --up 2 lbs x lov Pt reports quitting smoking and thinks this contributed Discussed diet and weight loss 
  
Reviewed labs 7/18 
  
Pt saw Dr. Colt Bhatt (derm) for sores Pt has not seen this physician recently 
  
Pt has seen Dr. Cherie Zhao (uro-gyn) for recurrent hematuria Last visit was 6/4/18 Per pt, a lesion was found in her bladder, had a FISH test and a CT scan  Reports all normal in hte end Pt went to [facility] for CT scan, per pt this was nl Per pt, she discussed having urodynamics test and possible bulking procedure for leakage However pt says she was going to hold off on this for now d/t costs Pt saw Dr Gemma Peabody (ortho) for R shoulder pain Reviewed notes 9/4/18: Patient expresses understanding and is in agreement with the plan. If she has any problems at all in the meantime, if symptoms worsen or new symptoms develop, she should let us know. At this point, sling and gentle range-of-motion exercises and anti-inflammatories and tramadol. Restricted activity and recheck in a week. If not markedly better, get an MRI Her sx improved  
 
Continues lipitor 80mg daily for cholesterol Pt states she had not been very consistent with this around time of last lipids check She has been more consistent in the past 1 month Lov, decreased prozac to 20mg (from 40), and added wellbutrin 150mg daily - reports compliance, happy with dose This works well for depresion and anxiety which are well controlled Not sad or down Pt has xanax to use prn (once weekly or less) as well, works well  
LikeAndy 
 Pt is noncompliant with CPAP qhs for JUAN ANTONIO No interested in f/u with Dr Sissy Brady (sleep) currently Lov, pt c/o HAs Reviewed head CT: nl 
Overall improved Pt c/o sore throat, HA, L ear pain x this weekend - worse Discussed likely viral 
Advised zyrtec and flonase OTC Pt has been using flonase and tylenol sinus Discussed hurricane spray, pt states she has some OTC medicine for sore throat On exam, pt had a 2/6 cardiac murmur Ordered ECHO 
    
Pt reports quitting smoking since lov Encouraged continued cessation Pt thinks CT lung cancer screen was not covered, will hold off on this for now 
  
ACP not on file. SDM is her . Provided information in the past.  
   
PREVENTIVE:   
Colonoscopy: 08, due in 2018, [place], due, ordered, reminded, ordered FIT test 18, reminded Pap: Dr. Mayito Garcia , h/o hysterectomy and BSO in her 29's for endometriosis Mammogram: 1/15, still due, reminded again to schedule, ordered, reordered, reminded, reordered Dexa: due, declines for now, wants to wait till age 63, ordered, reminded again Tdap: 2012 Pneumovax: not yet needed Shingrix: ordered 18 Flu shot: 01/15/19 Eye exam: 10/17, [facility], due A1c:  5.8,  5.8,  5.4,  5.4,  5.5 Lipids:   Hep C Screenin/24/15, negative EK18 nsr CT lung cancer screen: not covered, pt will hold off Patient Active Problem List  
 Diagnosis Date Noted  Depression 2012  Anxiety 2012  Hyperlipidemia 2012  
 HTN (hypertension) 2012  Microscopic hematuria 2012  Fatty liver 2012  JUAN ANTONIO on CPAP 2012 Current Outpatient Medications Medication Sig Dispense Refill  buPROPion XL (WELLBUTRIN XL) 150 mg tablet TAKE 1 TABLET BY MOUTH EVERY MORNING 30 Tab 0  
 FLUoxetine (PROZAC) 40 mg capsule TAKE 1 CAPSULE BY MOUTH EVERY DAY 90 Cap 0  
  ALPRAZolam (XANAX) 0.25 mg tablet TAKE 1 TABLET BY MOUTH EVERY EVENING AS NEEDED 15 Tab 0  
 atorvastatin (LIPITOR) 80 mg tablet TAKE 1 TABLET BY MOUTH EVERY NIGHT AT BEDTIME 90 Tab 0  
 FLUoxetine (PROZAC) 20 mg tablet Take 1 Tab by mouth daily. 30 Tab 2  
 losartan (COZAAR) 50 mg tablet Take 1 Tab by mouth daily. 90 Tab 1  
 hydroCHLOROthiazide (HYDRODIURIL) 25 mg tablet Take 1 Tab by mouth daily. 90 Tab 1  
 nystatin (MYCOSTATIN) powder aaa tid prn 60 g 0  
 PSEUDOEPHEDRINE/ACETAMINOPHEN (TYLENOL SINUS PO) Take  by mouth.  albuterol (PROAIR HFA) 90 mcg/actuation inhaler Take 2 Puffs by inhalation every four (4) hours as needed for Wheezing. 1 Inhaler 3  
 atorvastatin (LIPITOR) 80 mg tablet TAKE 1 TABLET BY MOUTH NIGHTLY 90 Tab 0 Past Surgical History:  
Procedure Laterality Date  HX HYSTERECTOMY  HX ORTHOPAEDIC    
 right foot surgery  HX TONSILLECTOMY Lab Results Component Value Date/Time WBC 6.8 07/30/2018 09:02 AM  
 HGB 14.9 07/30/2018 09:02 AM  
 HCT 44.3 07/30/2018 09:02 AM  
 PLATELET 723 99/63/7433 09:02 AM  
 MCV 93 07/30/2018 09:02 AM  
 
Lab Results Component Value Date/Time Cholesterol, total 228 (H) 07/30/2018 09:02 AM  
 HDL Cholesterol 72 07/30/2018 09:02 AM  
 LDL, calculated 129 (H) 07/30/2018 09:02 AM  
 Triglyceride 134 07/30/2018 09:02 AM  
 
Lab Results Component Value Date/Time GFR est non-AA 96 07/30/2018 09:02 AM  
 GFR est  07/30/2018 09:02 AM  
 Creatinine 0.67 07/30/2018 09:02 AM  
 BUN 13 07/30/2018 09:02 AM  
 Sodium 141 07/30/2018 09:02 AM  
 Potassium 3.9 07/30/2018 09:02 AM  
 Chloride 100 07/30/2018 09:02 AM  
 CO2 22 07/30/2018 09:02 AM  
  
Review of Systems HENT: Positive for ear pain and sore throat. Respiratory: Negative for shortness of breath. Cardiovascular: Negative for chest pain. Neurological: Positive for headaches.   
 
 
Physical Exam  
 Constitutional: She is oriented to person, place, and time. She appears well-developed and well-nourished. No distress. HENT:  
Head: Normocephalic and atraumatic. Right Ear: External ear normal.  
Left Ear: External ear normal.  
Mouth/Throat: Oropharynx is clear and moist. No oropharyngeal exudate. Mild cerumen R ear Eyes: Conjunctivae and EOM are normal. Right eye exhibits no discharge. Left eye exhibits no discharge. Neck: Normal range of motion. Neck supple. No carotid bruits Cardiovascular: Normal rate and regular rhythm. Exam reveals no gallop and no friction rub. Murmur (2/6) heard. Pulmonary/Chest: Effort normal and breath sounds normal. No respiratory distress. She has no wheezes. She has no rales. She exhibits no tenderness. Abdominal: Soft. She exhibits no distension and no mass. There is no tenderness. There is no rebound and no guarding. Musculoskeletal: Normal range of motion. She exhibits no edema, tenderness or deformity. Lymphadenopathy:  
  She has no cervical adenopathy. Neurological: She is alert and oriented to person, place, and time. Coordination normal.  
Skin: Skin is warm and dry. No rash noted. She is not diaphoretic. No erythema. No pallor. Psychiatric: She has a normal mood and affect. Her behavior is normal.  
 
 
ASSESSMENT and PLAN 
  ICD-10-CM ICD-9-CM 1. Physical exam, annual 
 
Discussed diet and w/l, pt quit smoking a while back and gained a bit of wt with that but working on portion control, flu shot today, labs ordered, COLO overdue which we addressed again today, mammo and DEXA due, eye exam due, needs to complete shingrix still Z00.00 V70.0 REFERRAL TO GASTROENTEROLOGY 2. Reactive depression Improved with prozac and wellbutrin, continue F32.9 300.4 3. Anxiety See above, also uses xanax infrequently F41.9 300.00   
4. Essential hypertension Initial BP elevated, repeat BP at goal, continue current regimen, no change I10 401.9 5. Pure hypercholesterolemia Last LDL above goal because she had not been compliant with her meds, will repeat lipids this summer, work on portion control and compliance E78.00 272.0   
6. JUAN ANTONIO on CPAP Not interested in further tx or evaluation G47.33 327.23   
 Z99.89 V46.8 7. Cardiac murmur Will check ECHO 
 R01.1 785.2 2D ECHO LIMTED ADULT W OR WO CONTR  
8. URI, acute Appears viral, no signs of bacterial infection, no abx needed J06.9 465.9 Scribed by Chante Ortiz [address], as dictated by Dr. Lisette Cerda. Current diagnosis and concerns discussed with pt at length. Pt understands risks and benefits or current treatment plan and medications, and accepts the treatment and medication with any possible risks. Pt asks appropriate questions, which were answered. Pt was instructed to call with any concerns or problems. I have reviewed the note documented by the scribe. The services provided are my own. The documentation is accurate

## 2019-01-31 ENCOUNTER — HOSPITAL ENCOUNTER (OUTPATIENT)
Dept: MAMMOGRAPHY | Age: 64
Discharge: HOME | End: 2019-01-31
Attending: INTERNAL MEDICINE
Payer: COMMERCIAL

## 2019-01-31 ENCOUNTER — HOSPITAL ENCOUNTER (OUTPATIENT)
Dept: NON INVASIVE DIAGNOSTICS | Age: 64
Discharge: HOME | End: 2019-01-31
Attending: INTERNAL MEDICINE
Payer: COMMERCIAL

## 2019-01-31 VITALS — WEIGHT: 166 LBS | BODY MASS INDEX: 27.66 KG/M2 | HEIGHT: 65 IN

## 2019-01-31 DIAGNOSIS — R01.1 CARDIAC MURMUR: ICD-10-CM

## 2019-01-31 DIAGNOSIS — Z12.39 SCREENING BREAST EXAMINATION: ICD-10-CM

## 2019-01-31 LAB
ECHO AO ROOT DIAM: 2.04 CM
ECHO AV AREA PEAK VELOCITY: 3 CM2
ECHO AV AREA VTI: 2.8 CM2
ECHO AV AREA/BSA PEAK VELOCITY: 1.6 CM2/M2
ECHO AV AREA/BSA VTI: 1.5 CM2/M2
ECHO AV MEAN GRADIENT: 6.1 MMHG
ECHO AV PEAK GRADIENT: 9.9 MMHG
ECHO AV PEAK VELOCITY: 157.34 CM/S
ECHO AV VTI: 34.23 CM
ECHO EST RA PRESSURE: 10 MMHG
ECHO LV INTERNAL DIMENSION DIASTOLIC: 3.53 CM (ref 3.9–5.3)
ECHO LV INTERNAL DIMENSION SYSTOLIC: 2.49 CM
ECHO LV IVSD: 0.94 CM (ref 0.6–0.9)
ECHO LV MASS 2D: 98.9 G (ref 67–162)
ECHO LV MASS INDEX 2D: 54.1 G/M2 (ref 43–95)
ECHO LV POSTERIOR WALL DIASTOLIC: 0.87 CM (ref 0.6–0.9)
ECHO LVOT DIAM: 1.97 CM
ECHO LVOT PEAK GRADIENT: 9.4 MMHG
ECHO LVOT PEAK VELOCITY: 153.66 CM/S
ECHO LVOT SV: 96.3 ML
ECHO LVOT VTI: 31.64 CM
ECHO MV A VELOCITY: 78.67 CM/S
ECHO MV AREA PHT: 1.9 CM2
ECHO MV E DECELERATION TIME (DT): 407.3 MS
ECHO MV E POINT SEPTAL SEPARATION (EPSS): 4.1 CM
ECHO MV E VELOCITY: 0.61 CM/S
ECHO MV E/A RATIO: 0.01
ECHO MV PRESSURE HALF TIME (PHT): 118.1 MS
ECHO MV REGURGITANT PEAK GRADIENT: 51.5 MMHG
ECHO MV REGURGITANT PEAK VELOCITY: 358.83 CM/S
ECHO PULMONARY ARTERY SYSTOLIC PRESSURE (PASP): 18 MMHG
ECHO PV MAX VELOCITY: 79.91 CM/S
ECHO PV PEAK GRADIENT: 2.6 MMHG
ECHO RIGHT VENTRICULAR SYSTOLIC PRESSURE (RVSP): 18 MMHG
ECHO RV INTERNAL DIMENSION: 2.04 CM
ECHO TV REGURGITANT MAX VELOCITY: 141.11 CM/S
ECHO TV REGURGITANT PEAK GRADIENT: 8 MMHG

## 2019-01-31 PROCEDURE — 77063 BREAST TOMOSYNTHESIS BI: CPT

## 2019-01-31 PROCEDURE — 93306 TTE W/DOPPLER COMPLETE: CPT

## 2019-02-01 NOTE — PROGRESS NOTES
Let her know ef normal at 55-60% Mitral valve is mild/moderate leaky Need to maintain good bp to protect heart, losartan one of her bp meds helps protect as well Will repeat echo in 1-2 years

## 2019-02-01 NOTE — PROGRESS NOTES
Called, spoke with Pt. Two pt identifiers confirmed. Pt informed per Dr. Arpan Alford that ef is normal at 55-60% and the mitral valve is mild/moderate leaky. Pt informed per Dr. Arpan Alford pt needs to maintain good bp to protect heart and taking the Losartan helps protect the heart as well. Pt informed per Dr. Arpan Alford pt will repeat the echo in 1-2 years. Pt verbalized understanding of information discussed w/ no further questions at this time.

## 2019-02-12 ENCOUNTER — DOCUMENTATION ONLY (OUTPATIENT)
Dept: INTERNAL MEDICINE CLINIC | Age: 64
End: 2019-02-12

## 2019-02-12 DIAGNOSIS — F41.9 ANXIETY: ICD-10-CM

## 2019-02-12 RX ORDER — ALPRAZOLAM 0.25 MG/1
TABLET ORAL
Qty: 15 TAB | Refills: 0 | Status: SHIPPED | OUTPATIENT
Start: 2019-02-12 | End: 2019-06-10 | Stop reason: SDUPTHER

## 2019-02-12 NOTE — PROGRESS NOTES
Following rx was faxed to pharmacy with confirmation received:      ALPRAZolam (XANAX) 0.25 mg tablet [[id number]]   Order Details   Dose, Route, Frequency: As Directed    Dispense Quantity: 15 Tab Refills: 0 Fills remaining: --           Sig: TAKE 1 TABLET BY MOUTH EVERY EVENING AS NEEDED          Written Date: 02/12/19 Expiration Date: --     Start Date: 02/12/19 End Date: --

## 2019-04-09 ENCOUNTER — OFFICE VISIT (OUTPATIENT)
Dept: CARDIOLOGY CLINIC | Age: 64
End: 2019-04-09

## 2019-04-09 VITALS
RESPIRATION RATE: 16 BRPM | SYSTOLIC BLOOD PRESSURE: 156 MMHG | OXYGEN SATURATION: 96 % | BODY MASS INDEX: 27.94 KG/M2 | DIASTOLIC BLOOD PRESSURE: 106 MMHG | WEIGHT: 167.7 LBS | HEART RATE: 77 BPM | HEIGHT: 65 IN

## 2019-04-09 DIAGNOSIS — R01.1 CARDIAC MURMUR: ICD-10-CM

## 2019-04-09 DIAGNOSIS — I10 ESSENTIAL HYPERTENSION: Primary | ICD-10-CM

## 2019-04-09 NOTE — PROGRESS NOTES
[address] [place] [address], [place], [address] [facility]     Blair Disla is a 66 y.o. female. New to me. Subjective:     Mrs. Lorri Robert had an echo on 1/31/19 that demonstrated mild to moderate mitral valve regurgitation. Today, she reports some shortness of breath with exertion. She states she does not take her antihypertensive medications. She does not wake up at night gasping for breath. She denies LE edema. She notes she is unable to tolerate lisinopril due to a cough. Patient further denies any exertional chest pain, palpitations, syncope, orthopnea, or paroxysmal nocturnal dyspnea.       Patient Active Problem List    Diagnosis Date Noted    Depression 09/21/2012    Anxiety 09/21/2012    Hyperlipidemia 09/21/2012    HTN (hypertension) 09/21/2012    Microscopic hematuria 09/21/2012    Fatty liver 09/21/2012    JUAN ANTONIO on CPAP 09/21/2012      Muriel Massey MD  Past Medical History:   Diagnosis Date    Hypercholesterolemia     Hypertension     UTI (urinary tract infection)       Past Surgical History:   Procedure Laterality Date    HX BREAST BIOPSY Right     20 years ago    HX HYSTERECTOMY      HX ORTHOPAEDIC      right foot surgery    HX TONSILLECTOMY       Allergies   Allergen Reactions    Lisinopril Cough      Family History   Problem Relation Age of Onset    Hypertension Mother     High Cholesterol Mother     Hypertension Father     High Cholesterol Father       Social History     Socioeconomic History    Marital status:      Spouse name: Not on file    Number of children: Not on file    Years of education: Not on file    Highest education level: Not on file   Occupational History    Not on file   Social Needs    Financial resource strain: Not on file    Food insecurity:     Worry: Not on file     Inability: Not on file    Transportation needs:     Medical: Not on file     Non-medical: Not on file   Tobacco Use    Smoking status: Current Some Day Smoker     Packs/day: 0.50 Types: Cigarettes    Smokeless tobacco: Never Used    Tobacco comment: 4 cigarettes daily   Substance and Sexual Activity    Alcohol use: No    Drug use: No    Sexual activity: Not Currently   Lifestyle    Physical activity:     Days per week: Not on file     Minutes per session: Not on file    Stress: Not on file   Relationships    Social connections:     Talks on phone: Not on file     Gets together: Not on file     Attends Muslim service: Not on file     Active member of club or organization: Not on file     Attends meetings of clubs or organizations: Not on file     Relationship status: Not on file    Intimate partner violence:     Fear of current or ex partner: Not on file     Emotionally abused: Not on file     Physically abused: Not on file     Forced sexual activity: Not on file   Other Topics Concern    Not on file   Social History Narrative    Not on file      Current Outpatient Medications   Medication Sig    ALPRAZolam (XANAX) 0.25 mg tablet TAKE 1 TABLET BY MOUTH EVERY EVENING AS NEEDED    FLUoxetine (PROZAC) 20 mg tablet Take 1 Tab by mouth daily.  nystatin (MYCOSTATIN) powder aaa tid prn    PSEUDOEPHEDRINE/ACETAMINOPHEN (TYLENOL SINUS PO) Take  by mouth daily as needed.  albuterol (PROAIR HFA) 90 mcg/actuation inhaler Take 2 Puffs by inhalation every four (4) hours as needed for Wheezing.  buPROPion XL (WELLBUTRIN XL) 150 mg tablet TAKE 1 TABLET BY MOUTH EVERY MORNING    losartan (COZAAR) 50 mg tablet Take 1 Tab by mouth daily.  hydroCHLOROthiazide (HYDRODIURIL) 25 mg tablet Take 1 Tab by mouth daily.  atorvastatin (LIPITOR) 80 mg tablet TAKE 1 TABLET BY MOUTH NIGHTLY     No current facility-administered medications for this visit. Review of Systems  11 systems negative, except as stated in the HPI.      Physical Exam:    Visit Vitals  BP (!) 156/106 (BP 1 Location: Right arm, BP Patient Position: Sitting)   Pulse 77   Resp 16   Ht 5' 5\" (1.651 m)   Wt 167 lb 11.2 oz (76.1 kg)   SpO2 96%   BMI 27.91 kg/m²     Wt Readings from Last 3 Encounters:   04/09/19 167 lb 11.2 oz (76.1 kg)   01/31/19 166 lb (75.3 kg)   01/15/19 166 lb (75.3 kg)       Gen: NAD    Mental Status - Alert. General Appearance - Not in acute distress. Neck - no JVD    Chest and Lung Exam   Inspection: Accessory muscles - No use of accessory muscles in breathing. Auscultation:   Breath sounds: - Normal.     Cardiovascular   Inspection: Jugular vein - Bilateral - Inspection Normal.   Palpation/Percussion:   Apical Impulse: - Normal.   Auscultation: Rhythm - Regular. Heart Sounds - S1 WNL and S2 WNL. No S3 or S4. Murmurs & Other Heart Sounds: Auscultation of the heart reveals - No Murmurs. Peripheral Vascular   Upper Extremity: Inspection - Bilateral - No Cyanotic nailbeds or Digital clubbing. Lower Extremity:   Palpation: Edema - Bilateral - No edema. Abdomen: Soft, non-tender, bowel sounds are active. Neuro: A&O times 3, CN and motor grossly WNL    Cardiographics  EKG 4/9/19 - SR, poor R wave progression, otherwise normal.      Assessment:     Encounter Diagnoses   Name Primary?  Essential hypertension Yes    Cardiac murmur       Plan:     Mrs. Lambert's echo 1/31/19 demonstrated EF 56-60% with mild to moderate mitral valve regurgitation. BP is elevated today at 156/106. She states she has not been taking her antihypertensive medications regularly. I recommended she restart Cozaar 50 mg daily and HCTZ 25 mg daily. EKG today is good. Follow up in 2 weeks to reassess blood pressure    Written by Tha Del Toro, as dictated by Dr. Lenore Chatterjee.

## 2019-04-09 NOTE — PROGRESS NOTES
1. Have you been to the ER, urgent care clinic since your last visit? Hospitalized since your last visit? No.    2. Have you seen or consulted any other health care providers outside of the [address] [place] [address] since your last visit? Include any pap smears or colon screening. No.        Chief Complaint   Patient presents with    New Patient     referred by PCP to discuss echo results- chest pain on and off, very fatigued, sob, heart fluttering     PT HAS NOT BEEN TAKING HER MEDS FOR 3 WEEKS OR 1 MONTH.

## 2019-04-19 RX ORDER — BUPROPION HYDROCHLORIDE 150 MG/1
TABLET ORAL
Qty: 30 TAB | Refills: 0 | Status: SHIPPED | OUTPATIENT
Start: 2019-04-19 | End: 2020-01-17

## 2019-06-10 ENCOUNTER — TELEPHONE (OUTPATIENT)
Dept: INTERNAL MEDICINE CLINIC | Age: 64
End: 2019-06-10

## 2019-06-10 DIAGNOSIS — F41.9 ANXIETY: ICD-10-CM

## 2019-06-10 RX ORDER — ALPRAZOLAM 0.25 MG/1
TABLET ORAL
Qty: 15 TAB | Refills: 0 | Status: SHIPPED | OUTPATIENT
Start: 2019-06-10 | End: 2019-08-20 | Stop reason: SDUPTHER

## 2019-06-10 NOTE — TELEPHONE ENCOUNTER
PCP: Karyle Berger, MD    Last appt: 1/15/2019  Future Appointments   Date Time Provider Tom Molina   7/1/2019  8:30 AM Karyle Berger, MD [facility] 87       Requested Prescriptions     Pending Prescriptions Disp Refills    ALPRAZolam (XANAX) 0.25 mg tablet 15 Tab 0     Sig: TAKE 1 TABLET BY MOUTH EVERY EVENING AS NEEDED     Xanax last filled 02/12/19 qty:15 refills:0

## 2019-08-20 DIAGNOSIS — F41.9 ANXIETY: ICD-10-CM

## 2019-08-20 RX ORDER — ALPRAZOLAM 0.25 MG/1
TABLET ORAL
Qty: 20 TAB | Refills: 0 | Status: SHIPPED | OUTPATIENT
Start: 2019-08-20 | End: 2019-12-16 | Stop reason: SDUPTHER

## 2019-08-21 ENCOUNTER — DOCUMENTATION ONLY (OUTPATIENT)
Dept: INTERNAL MEDICINE CLINIC | Age: 64
End: 2019-08-21

## 2019-08-21 NOTE — PROGRESS NOTES
The following prescription was called into pt's local pharmacy:    ALPRAZolam Shalonda Mcknight) 0.25 mg tablet [[id number]]     Order Details   Dose, Route, Frequency: As Directed    Dispense Quantity: 20 Tab Refills: 0 Fills remaining: --           Sig: TAKE 1 TABLET BY MOUTH EVERY EVENING AS NEEDED          Written Date: 08/20/19 Expiration Date: --     Start Date: 08/20/19 End Date: --

## 2019-09-04 RX ORDER — FLUOXETINE 20 MG/1
20 TABLET ORAL DAILY
Qty: 30 TAB | Refills: 2 | Status: SHIPPED | OUTPATIENT
Start: 2019-09-04 | End: 2019-12-16

## 2019-09-04 NOTE — TELEPHONE ENCOUNTER
PCP: Jeovany Ochoa MD    Last appt: 1/15/2019  Future Appointments   Date Time Provider Tom Molina   10/1/2019  2:45 PM Jeovany Ochoa MD [facility] 87       Requested Prescriptions     Pending Prescriptions Disp Refills    FLUoxetine (PROZAC) 20 mg tablet 30 Tab 2     Sig: Take 1 Tab by mouth daily.

## 2019-09-05 RX ORDER — NYSTATIN 100000 [USP'U]/G
POWDER TOPICAL
Qty: 60 G | Refills: 0 | Status: SHIPPED | OUTPATIENT
Start: 2019-09-05 | End: 2020-09-29 | Stop reason: SDUPTHER

## 2019-09-05 RX ORDER — FLUOXETINE 20 MG/1
20 TABLET ORAL DAILY
Qty: 30 TAB | Refills: 2 | Status: SHIPPED | OUTPATIENT
Start: 2019-09-05 | End: 2019-12-16

## 2019-09-11 RX ORDER — LOSARTAN POTASSIUM 50 MG/1
TABLET ORAL
Qty: 90 TAB | Refills: 0 | Status: SHIPPED | OUTPATIENT
Start: 2019-09-11 | End: 2019-12-16 | Stop reason: SDUPTHER

## 2019-09-11 RX ORDER — HYDROCHLOROTHIAZIDE 25 MG/1
TABLET ORAL
Qty: 90 TAB | Refills: 0 | Status: SHIPPED | OUTPATIENT
Start: 2019-09-11 | End: 2019-12-16 | Stop reason: SDUPTHER

## 2019-12-09 ENCOUNTER — PATIENT MESSAGE (OUTPATIENT)
Dept: INTERNAL MEDICINE CLINIC | Age: 64
End: 2019-12-09

## 2019-12-16 ENCOUNTER — OFFICE VISIT (OUTPATIENT)
Dept: INTERNAL MEDICINE CLINIC | Age: 64
End: 2019-12-16

## 2019-12-16 ENCOUNTER — DOCUMENTATION ONLY (OUTPATIENT)
Dept: INTERNAL MEDICINE CLINIC | Age: 64
End: 2019-12-16

## 2019-12-16 VITALS
HEART RATE: 88 BPM | OXYGEN SATURATION: 98 % | DIASTOLIC BLOOD PRESSURE: 119 MMHG | SYSTOLIC BLOOD PRESSURE: 182 MMHG | WEIGHT: 158 LBS | BODY MASS INDEX: 26.33 KG/M2 | HEIGHT: 65 IN | TEMPERATURE: 97.8 F | RESPIRATION RATE: 16 BRPM

## 2019-12-16 DIAGNOSIS — J06.9 URI, ACUTE: ICD-10-CM

## 2019-12-16 DIAGNOSIS — Z99.89 OSA ON CPAP: ICD-10-CM

## 2019-12-16 DIAGNOSIS — M54.2 NECK PAIN: ICD-10-CM

## 2019-12-16 DIAGNOSIS — F41.9 ANXIETY: ICD-10-CM

## 2019-12-16 DIAGNOSIS — G47.33 OSA ON CPAP: ICD-10-CM

## 2019-12-16 DIAGNOSIS — I10 ESSENTIAL HYPERTENSION: ICD-10-CM

## 2019-12-16 DIAGNOSIS — I34.0 MODERATE MITRAL REGURGITATION: ICD-10-CM

## 2019-12-16 DIAGNOSIS — K76.0 FATTY LIVER: ICD-10-CM

## 2019-12-16 DIAGNOSIS — E78.00 PURE HYPERCHOLESTEROLEMIA: ICD-10-CM

## 2019-12-16 DIAGNOSIS — F32.9 REACTIVE DEPRESSION: Primary | ICD-10-CM

## 2019-12-16 DIAGNOSIS — Z00.00 PHYSICAL EXAM, ANNUAL: ICD-10-CM

## 2019-12-16 RX ORDER — HYDROCHLOROTHIAZIDE 25 MG/1
TABLET ORAL
Qty: 90 TAB | Refills: 0 | Status: SHIPPED | OUTPATIENT
Start: 2019-12-16 | End: 2020-03-16

## 2019-12-16 RX ORDER — AMOXICILLIN AND CLAVULANATE POTASSIUM 875; 125 MG/1; MG/1
1 TABLET, FILM COATED ORAL EVERY 12 HOURS
Qty: 14 TAB | Refills: 0 | Status: SHIPPED | OUTPATIENT
Start: 2019-12-16 | End: 2019-12-23

## 2019-12-16 RX ORDER — LOSARTAN POTASSIUM 50 MG/1
TABLET ORAL
Qty: 90 TAB | Refills: 0 | Status: SHIPPED | OUTPATIENT
Start: 2019-12-16 | End: 2020-03-16

## 2019-12-16 RX ORDER — ATORVASTATIN CALCIUM 80 MG/1
TABLET, FILM COATED ORAL
Qty: 90 TAB | Refills: 0 | Status: SHIPPED | OUTPATIENT
Start: 2019-12-16 | End: 2020-04-16

## 2019-12-16 RX ORDER — ALPRAZOLAM 0.25 MG/1
TABLET ORAL
Qty: 20 TAB | Refills: 0 | Status: SHIPPED | OUTPATIENT
Start: 2019-12-16 | End: 2020-03-16

## 2019-12-16 NOTE — PATIENT INSTRUCTIONS
flonase daily Schedule eye exam , mammogram , pelvic exam  
 
Labs today!!!!! 
 
Restart medication s

## 2019-12-16 NOTE — PROGRESS NOTES
Last here 1/15/19. Pt is here for cpe        BP today is 192/119-- will get repeat          Wt today is 158 lbs-- down 8 lbs x lov   Discussed diet and weight loss     Will get labs today              Pt has restarted smoking   Now smoking about 0.5 ppd   Pt does not think she is at a place where she can quit   CT lung cancer screen prev not covered, pt would not like this reordered today     Reviewed colo 3/12/19 with dr Royer Dorsey-- 5 year repeat      ACP not on file. SDM is her . Provided information in the past.       PREVENTIVE:    Colonoscopy: 3/12/19 with Dr Shane Adames, 5 year repeat   Pap: Dr. Florance Hodgkin , h/o hysterectomy and BSO in her 31's for endometriosis   Mammogram: , negative, due   Dexa: due, declines for now, wants to wait till age 63, ordered, reminded again  Tdap: 2012  Pneumovax: not yet needed  Shingrix: ordered 20   Flu shot: 19   Eye exam: 10/17, [facility], due  A1c:  5.8,  5.8,  5.4,  5.4,  5.5   Lipids:    Hep C Screenin/24/15, negative  EK18 nsr  CT lung cancer screen: not covered, pt will hold off       Patient Active Problem List    Diagnosis Date Noted    Depression 2012    Anxiety 2012    Hyperlipidemia 2012    HTN (hypertension) 2012    Microscopic hematuria 2012    Fatty liver 2012    JUAN ANTONIO on CPAP 2012     Current Outpatient Medications   Medication Sig Dispense Refill    hydroCHLOROthiazide (HYDRODIURIL) 25 mg tablet TAKE 1 TABLET BY MOUTH DAILY 90 Tab 0    losartan (COZAAR) 50 mg tablet TAKE 1 TABLET BY MOUTH DAILY 90 Tab 0    nystatin (MYCOSTATIN) powder aaa tid prn 60 g 0    FLUoxetine (PROZAC) 20 mg tablet Take 1 Tab by mouth daily. 30 Tab 2    FLUoxetine (PROZAC) 20 mg tablet Take 1 Tab by mouth daily.  30 Tab 2    ALPRAZolam (XANAX) 0.25 mg tablet TAKE 1 TABLET BY MOUTH EVERY EVENING AS NEEDED 20 Tab 0    buPROPion XL (WELLBUTRIN XL) 150 mg tablet TAKE 1 TABLET BY MOUTH EVERY MORNING 30 Tab 0    PSEUDOEPHEDRINE/ACETAMINOPHEN (TYLENOL SINUS PO) Take  by mouth daily as needed.  albuterol (PROAIR HFA) 90 mcg/actuation inhaler Take 2 Puffs by inhalation every four (4) hours as needed for Wheezing. 1 Inhaler 3    atorvastatin (LIPITOR) 80 mg tablet TAKE 1 TABLET BY MOUTH NIGHTLY 90 Tab 0     Past Surgical History:   Procedure Laterality Date    HX BREAST BIOPSY Right     20 years ago    HX HYSTERECTOMY      HX ORTHOPAEDIC      right foot surgery    HX TONSILLECTOMY        Lab Results   Component Value Date/Time    WBC 6.8 07/30/2018 09:02 AM    HGB 14.9 07/30/2018 09:02 AM    HCT 44.3 07/30/2018 09:02 AM    PLATELET 898 89/82/3382 09:02 AM    MCV 93 07/30/2018 09:02 AM     Lab Results   Component Value Date/Time    Cholesterol, total 228 (H) 07/30/2018 09:02 AM    HDL Cholesterol 72 07/30/2018 09:02 AM    LDL, calculated 129 (H) 07/30/2018 09:02 AM    Triglyceride 134 07/30/2018 09:02 AM     Lab Results   Component Value Date/Time    GFR est non-AA 96 07/30/2018 09:02 AM    GFR est  07/30/2018 09:02 AM    Creatinine 0.67 07/30/2018 09:02 AM    BUN 13 07/30/2018 09:02 AM    Sodium 141 07/30/2018 09:02 AM    Potassium 3.9 07/30/2018 09:02 AM    Chloride 100 07/30/2018 09:02 AM    CO2 22 07/30/2018 09:02 AM        Review of Systems   Constitutional: Negative for chills and fever. HENT: Positive for congestion. Respiratory: Positive for cough. Negative for shortness of breath. Cardiovascular: Negative for chest pain. Physical Exam  Constitutional:       General: She is not in acute distress. Appearance: She is well-developed. She is not diaphoretic. HENT:      Head: Normocephalic and atraumatic.       Right Ear: Tympanic membrane, ear canal and external ear normal.      Left Ear: Tympanic membrane, ear canal and external ear normal.      Ears:      Comments: Erythema in R  Ear      Mouth/Throat:      Mouth: Mucous membranes are moist.      Pharynx: Oropharynx is clear. No oropharyngeal exudate. Eyes:      Conjunctiva/sclera: Conjunctivae normal.   Neck:      Musculoskeletal: Normal range of motion and neck supple. Vascular: No carotid bruit. Comments: Tender lymph node on R side of neck   Cardiovascular:      Rate and Rhythm: Normal rate and regular rhythm. Heart sounds: Murmur (2/6 on L sternal border ) present. No friction rub. No gallop. Pulmonary:      Effort: Pulmonary effort is normal. No respiratory distress. Breath sounds: Normal breath sounds. No wheezing or rales. Chest:      Chest wall: No tenderness. Abdominal:      General: There is no distension. Palpations: Abdomen is soft. There is no mass. Tenderness: There is no tenderness. There is no guarding or rebound. Hernia: No hernia is present. Musculoskeletal: Normal range of motion. General: No tenderness or deformity. Lymphadenopathy:      Cervical: No cervical adenopathy. Skin:     General: Skin is warm and dry. Coloration: Skin is not pale. Findings: No erythema or rash. Neurological:      Mental Status: She is alert and oriented to person, place, and time.       Coordination: Coordination normal.   Psychiatric:         Behavior: Behavior normal.         ASSESSMENT and PLAN    ICD-10-CM ICD-9-CM                                                                                                                                                                                                                                  7. Physical exam, annual                  Pt started smoking again not interested in cessation stressed cessation discussed lung cancer screen not interested mammo due next month order flu shot today dexa utd colo utd labs ordered eye exam due  Z00.00 V70.0 CYNDI MAMMO BI SCREENING INCL CAD      LIPID PANEL Scribed by Syd Walls of [address] [place] [address] [address], as dictated by Dr. Cornelia Trevino. Current diagnosis and concerns discussed with pt at length. Pt understands risks and benefits or current treatment plan and medications, and accepts the treatment and medication with any possible risks. Pt asks appropriate questions, which were answered. Pt was instructed to call with any concerns or problems. I have reviewed the note documented by the scribe. The services provided are my own.   The documentation is accurate

## 2019-12-18 ENCOUNTER — PATIENT MESSAGE (OUTPATIENT)
Dept: INTERNAL MEDICINE CLINIC | Age: 64
End: 2019-12-18

## 2019-12-18 LAB
ALBUMIN SERPL-MCNC: 4.7 G/DL (ref 3.6–4.8)
ALBUMIN/GLOB SERPL: 1.6 {RATIO} (ref 1.2–2.2)
ALP SERPL-CCNC: 112 IU/L (ref 39–117)
ALT SERPL-CCNC: 11 IU/L (ref 0–32)
AST SERPL-CCNC: 10 IU/L (ref 0–40)
BILIRUB SERPL-MCNC: 0.5 MG/DL (ref 0–1.2)
BUN SERPL-MCNC: 12 MG/DL (ref 8–27)
BUN/CREAT SERPL: 16 (ref 12–28)
CALCIUM SERPL-MCNC: 9.7 MG/DL (ref 8.7–10.3)
CHLORIDE SERPL-SCNC: 100 MMOL/L (ref 96–106)
CHOLEST SERPL-MCNC: 368 MG/DL (ref 100–199)
CO2 SERPL-SCNC: 21 MMOL/L (ref 20–29)
COMMENT, 011824: ABNORMAL
CREAT SERPL-MCNC: 0.74 MG/DL (ref 0.57–1)
ERYTHROCYTE [DISTWIDTH] IN BLOOD BY AUTOMATED COUNT: 11.9 % (ref 12.3–15.4)
EST. AVERAGE GLUCOSE BLD GHB EST-MCNC: 111 MG/DL
GLOBULIN SER CALC-MCNC: 2.9 G/DL (ref 1.5–4.5)
GLUCOSE SERPL-MCNC: 108 MG/DL (ref 65–99)
HBA1C MFR BLD: 5.5 % (ref 4.8–5.6)
HCT VFR BLD AUTO: 47.6 % (ref 34–46.6)
HDLC SERPL-MCNC: 70 MG/DL
HGB BLD-MCNC: 15.6 G/DL (ref 11.1–15.9)
LDLC SERPL CALC-MCNC: 272 MG/DL (ref 0–99)
MCH RBC QN AUTO: 30.1 PG (ref 26.6–33)
MCHC RBC AUTO-ENTMCNC: 32.8 G/DL (ref 31.5–35.7)
MCV RBC AUTO: 92 FL (ref 79–97)
PLATELET # BLD AUTO: 366 X10E3/UL (ref 150–450)
POTASSIUM SERPL-SCNC: 3.8 MMOL/L (ref 3.5–5.2)
PROT SERPL-MCNC: 7.6 G/DL (ref 6–8.5)
RBC # BLD AUTO: 5.18 X10E6/UL (ref 3.77–5.28)
SODIUM SERPL-SCNC: 141 MMOL/L (ref 134–144)
TRIGL SERPL-MCNC: 130 MG/DL (ref 0–149)
TSH SERPL DL<=0.005 MIU/L-ACNC: 2.21 UIU/ML (ref 0.45–4.5)
VLDLC SERPL CALC-MCNC: 26 MG/DL (ref 5–40)
WBC # BLD AUTO: 10 X10E3/UL (ref 3.4–10.8)

## 2019-12-18 NOTE — PROGRESS NOTES
Have her restart all her meds as discussed in clinic  Lipids extremely high make sure she takes her lipitor

## 2019-12-19 NOTE — PROGRESS NOTES
Pt notified via [employer] of results and recommendations per Dr. Camille Boyer. Orders placed/pended prn. Pt has read and/or responded w/ understanding of information discussed w/ no further questions at this time.

## 2020-01-03 RX ORDER — FLUOXETINE HYDROCHLORIDE 40 MG/1
40 CAPSULE ORAL DAILY
Qty: 90 CAP | Refills: 1 | Status: SHIPPED | OUTPATIENT
Start: 2020-01-03 | End: 2020-07-18 | Stop reason: SDUPTHER

## 2020-01-17 ENCOUNTER — OFFICE VISIT (OUTPATIENT)
Dept: INTERNAL MEDICINE CLINIC | Age: 65
End: 2020-01-17

## 2020-01-17 VITALS
DIASTOLIC BLOOD PRESSURE: 80 MMHG | HEART RATE: 81 BPM | TEMPERATURE: 98.1 F | SYSTOLIC BLOOD PRESSURE: 135 MMHG | WEIGHT: 155.2 LBS | BODY MASS INDEX: 25.86 KG/M2 | OXYGEN SATURATION: 98 % | RESPIRATION RATE: 16 BRPM | HEIGHT: 65 IN

## 2020-01-17 DIAGNOSIS — Z72.0 TOBACCO USE: ICD-10-CM

## 2020-01-17 DIAGNOSIS — Z99.89 OSA ON CPAP: ICD-10-CM

## 2020-01-17 DIAGNOSIS — K76.0 FATTY LIVER: ICD-10-CM

## 2020-01-17 DIAGNOSIS — G47.33 OSA ON CPAP: ICD-10-CM

## 2020-01-17 DIAGNOSIS — E78.00 PURE HYPERCHOLESTEROLEMIA: ICD-10-CM

## 2020-01-17 DIAGNOSIS — E66.3 OVERWEIGHT: ICD-10-CM

## 2020-01-17 DIAGNOSIS — R10.0 ACUTE ABDOMINAL PAIN SYNDROME: Primary | ICD-10-CM

## 2020-01-17 DIAGNOSIS — R10.12 LUQ PAIN: ICD-10-CM

## 2020-01-17 DIAGNOSIS — I10 ESSENTIAL HYPERTENSION: ICD-10-CM

## 2020-01-17 DIAGNOSIS — M47.812 SPONDYLOSIS OF CERVICAL REGION WITHOUT MYELOPATHY OR RADICULOPATHY: ICD-10-CM

## 2020-01-17 DIAGNOSIS — F32.9 REACTIVE DEPRESSION: Primary | ICD-10-CM

## 2020-01-17 NOTE — PROGRESS NOTES
HISTORY OF PRESENT ILLNESS  Raphael Ramey is a 60 y.o. female. HPI   Last here 12/16/19 Pt is here for routine care.    Lov, pt was off all her meds, restarted her meds lov, she reports compliance with everything now          BP today is 135/80   BP at home 130s/80s   Now back on losartan 50mg and HCTZ 25mg        Wt today is 155 lbs-- down 3 lbs xlov   Discussed diet and weight loss     reviewed labs 12/19   Pt will come back for labs      Pt saw Dr. Chey Pelaez (derm) for sores  Pt has not seen this physician recently     Pt has seen Dr. Lucy Goldstein (uro-gyn) for recurrent hematuria  Last visit was 6/4/18  Per pt, a lesion was found in her bladder, had a FISH test and a CT scan  Reports all normal in hte end   Pt went to [facility] for CT scan, per pt this was nl  Per pt, she discussed having urodynamics test and possible bulking procedure for leakage  However pt says she was going to hold off on this for now d/t costs     Pt saw Dr Elaine Whitlock (ortho) for R shoulder pain  Last visit was 9/4/18   Her sx improved      She has not been taking lipitor 80mg daily for cholesterol   Discussed compliance, pt states she will restart        Lov, pt was very down, did not want to do anything, went off all her meds   restartec prozac 20 mg which was tapered up to 40 mg and continues on wellbutrin 150 mg   prev decreased prozac to 20mg (from 40), and added wellbutrin 150mg daily   Pt has xanax to use prn (once weekly or less) as well, works well, discussed using this occasionally       Pt is noncompliant with CPAP qhs for JUAN ANTONIO  No interested in f/u with Dr Tye Morgan (sleep) currently      pt is now following with Dr Lonnie Gavin (cardio)   Last visit was 4/9/19  Technically difficult study.       Pt c/o again of LUQ abd pain x 10/19   She c/o this lov but thought to be related to coughing but has not gone away   She states the pain has gotten better but not totally resolved   She states abd pain got worse when she was sick and coughing   States sxs are not constant but feels like an ache that comes and goes   States this happens everyday   Not worse with food   She has been taking aleve with this   Reviewed CT abd 18-- severe DJD in L4-L5, spinal degenerative changes, kidneys bladder nl, no uterus   Will get XR of ribs            Pt has restarted smoking   Now smoking about 0.5 ppd   Pt does not think she is at a place where she can quit   CT lung cancer screen prev not covered, pt would not like this reordered today      ACP not on file. SDM is her . Provided information in the past.       PREVENTIVE:    Colonoscopy: 3/12/19 with Dr Ignacio Leventhal, 5 year repeat   Pap: Dr. Mateo Vivar , h/o hysterectomy and BSO in her 31's for endometriosis, due   Mammogram: , negative, due   Dexa: due, declines for now, wants to wait till age 63, ordered, reminded again  Tdap: 2012  Pneumovax: not yet needed  Shingrix: ordered 18   Flu shot: 19   Eye exam: 10/17, [facility] at [facility], due  A1c:  5.8,  5.8,  5.4,  5.4,  5.5  5.5   Lipids:  LDL 272  Hep C Screenin/24/15, negative  EK18 nsr  CT lung cancer screen: not covered, pt will hold off  AAA: completed 18 on CT abd, negative     Patient Active Problem List    Diagnosis Date Noted    Depression 2012    Anxiety 2012    Hyperlipidemia 2012    HTN (hypertension) 2012    Microscopic hematuria 2012    Fatty liver 2012    JUAN ANTONIO on CPAP 2012     Current Outpatient Medications   Medication Sig Dispense Refill    FLUoxetine (PROZAC) 40 mg capsule Take 1 Cap by mouth daily.  90 Cap 1    ALPRAZolam (XANAX) 0.25 mg tablet TAKE 1 TABLET BY MOUTH EVERY EVENING AS NEEDED 20 Tab 0    hydroCHLOROthiazide (HYDRODIURIL) 25 mg tablet TAKE 1 TABLET BY MOUTH DAILY 90 Tab 0    losartan (COZAAR) 50 mg tablet TAKE 1 TABLET BY MOUTH DAILY 90 Tab 0    atorvastatin (LIPITOR) 80 mg tablet TAKE 1 TABLET BY MOUTH NIGHTLY 90 Tab 0    nystatin (MYCOSTATIN) powder aaa tid prn 60 g 0    buPROPion XL (WELLBUTRIN XL) 150 mg tablet TAKE 1 TABLET BY MOUTH EVERY MORNING 30 Tab 0    PSEUDOEPHEDRINE/ACETAMINOPHEN (TYLENOL SINUS PO) Take  by mouth daily as needed.  albuterol (PROAIR HFA) 90 mcg/actuation inhaler Take 2 Puffs by inhalation every four (4) hours as needed for Wheezing. 1 Inhaler 3     Past Surgical History:   Procedure Laterality Date    HX BREAST BIOPSY Right     20 years ago    HX HYSTERECTOMY      HX ORTHOPAEDIC      right foot surgery    HX TONSILLECTOMY        Lab Results   Component Value Date/Time    WBC 10.0 12/17/2019 07:49 AM    HGB 15.6 12/17/2019 07:49 AM    HCT 47.6 (H) 12/17/2019 07:49 AM    PLATELET 567 27/00/6960 07:49 AM    MCV 92 12/17/2019 07:49 AM     Lab Results   Component Value Date/Time    Cholesterol, total 368 (H) 12/17/2019 07:49 AM    HDL Cholesterol 70 12/17/2019 07:49 AM    LDL, calculated 272 (H) 12/17/2019 07:49 AM    Triglyceride 130 12/17/2019 07:49 AM     Lab Results   Component Value Date/Time    GFR est non-AA 87 12/17/2019 07:49 AM    GFR est  12/17/2019 07:49 AM    Creatinine 0.74 12/17/2019 07:49 AM    BUN 12 12/17/2019 07:49 AM    Sodium 141 12/17/2019 07:49 AM    Potassium 3.8 12/17/2019 07:49 AM    Chloride 100 12/17/2019 07:49 AM    CO2 21 12/17/2019 07:49 AM        Review of Systems   Respiratory: Negative for shortness of breath. Cardiovascular: Negative for chest pain. Physical Exam  Constitutional:       General: She is not in acute distress. Appearance: She is well-developed. She is not diaphoretic. HENT:      Head: Normocephalic and atraumatic. Eyes:      General:         Right eye: No discharge. Left eye: No discharge. Conjunctiva/sclera: Conjunctivae normal.   Neck:      Musculoskeletal: Normal range of motion and neck supple. Cardiovascular:      Rate and Rhythm: Normal rate and regular rhythm.       Heart sounds: Normal heart sounds. No murmur. No friction rub. No gallop. Pulmonary:      Effort: Pulmonary effort is normal. No respiratory distress. Breath sounds: Normal breath sounds. No wheezing or rales. Chest:      Chest wall: No tenderness. Abdominal:      General: There is no distension. Palpations: Abdomen is soft. There is no mass. Tenderness: There is tenderness (Ttp over L lower rib cage ). There is no guarding or rebound. Hernia: No hernia is present. Musculoskeletal: Normal range of motion. General: No tenderness or deformity. Lymphadenopathy:      Cervical: No cervical adenopathy. Skin:     General: Skin is warm and dry. Coloration: Skin is not pale. Findings: No erythema or rash. Neurological:      Mental Status: She is alert and oriented to person, place, and time. Coordination: Coordination normal.   Psychiatric:         Mood and Affect: Mood normal.         Behavior: Behavior normal.         ASSESSMENT and PLAN    ICD-10-CM ICD-9-CM    1. Reactive depression                  Pt with severe depression lov had stopped all her meds since then we restarted her prozac and discussed tapering up to 40 mg doing well on 40 mg  F32.9 300.4    2. Pure hypercholesterolemia                      Completely uncontrolled due to noncompliance with meds she stopped all her meds lov restarted lipitor repeat lipids today and adjust prn  E78.00 272.0    3. Essential hypertension                    uncontrolled lov due to noncompliance with meds restarted her hctz and losartan now well controlled on current regimen  I10 401.9    4. JUAN ANTONIO on CPAP                  Not compliant with cpap and not interested with f/u at this time  G47.33 327.23     Z99.89 V46.8    5. Fatty liver                  Improved on last labs  K76.0 571.8    6. Overweight            Discussed diet and wt loss portion control  E66.3 278.02    7.  LUQ pain-- really pain Is over her irbs will get rib series   8. Tobacco use- not ready to quit smoking   9. 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 Abad Riggs. Current diagnosis and concerns discussed with pt at length. Pt understands risks and benefits or current treatment plan and medications, and accepts the treatment and medication with any possible risks. Pt asks appropriate questions, which were answered. Pt was instructed to call with any concerns or problems. I have reviewed the note documented by the scribe. The services provided are my own.   The documentation is accurate

## 2020-01-20 ENCOUNTER — TELEPHONE (OUTPATIENT)
Dept: INTERNAL MEDICINE CLINIC | Age: 65
End: 2020-01-20

## 2020-01-20 RX ORDER — FLUOXETINE HYDROCHLORIDE 20 MG/1
20 CAPSULE ORAL DAILY
Qty: 90 CAP | Refills: 1 | Status: SHIPPED | OUTPATIENT
Start: 2020-01-20 | End: 2020-09-29

## 2020-04-16 RX ORDER — ATORVASTATIN CALCIUM 80 MG/1
TABLET, FILM COATED ORAL
Qty: 90 TAB | Refills: 0 | Status: SHIPPED | OUTPATIENT
Start: 2020-04-16 | End: 2020-07-18 | Stop reason: SDUPTHER

## 2020-06-17 RX ORDER — LOSARTAN POTASSIUM 50 MG/1
TABLET ORAL
Qty: 90 TAB | Refills: 0 | Status: SHIPPED | OUTPATIENT
Start: 2020-06-17 | End: 2020-09-14

## 2020-06-17 RX ORDER — HYDROCHLOROTHIAZIDE 25 MG/1
TABLET ORAL
Qty: 90 TAB | Refills: 0 | Status: SHIPPED | OUTPATIENT
Start: 2020-06-17 | End: 2020-09-14

## 2020-07-16 NOTE — TELEPHONE ENCOUNTER
Pt is booked too far out was to be een in early July    Please call and move September appointment to July or early august

## 2020-07-17 DIAGNOSIS — Z99.89 OSA ON CPAP: Primary | ICD-10-CM

## 2020-07-17 DIAGNOSIS — G47.33 OSA ON CPAP: Primary | ICD-10-CM

## 2020-07-20 RX ORDER — ATORVASTATIN CALCIUM 80 MG/1
TABLET, FILM COATED ORAL
Qty: 90 TAB | Refills: 0 | Status: SHIPPED | OUTPATIENT
Start: 2020-07-20 | End: 2020-12-22

## 2020-07-21 RX ORDER — ATORVASTATIN CALCIUM 80 MG/1
TABLET, FILM COATED ORAL
Qty: 90 TAB | Refills: 0 | OUTPATIENT
Start: 2020-07-21

## 2020-07-21 RX ORDER — FLUOXETINE HYDROCHLORIDE 20 MG/1
CAPSULE ORAL
Qty: 90 CAP | Refills: 1 | OUTPATIENT
Start: 2020-07-21

## 2020-08-12 DIAGNOSIS — Z72.0 TOBACCO USE: Primary | ICD-10-CM

## 2020-08-12 RX ORDER — VARENICLINE TARTRATE 25 MG
KIT ORAL
Qty: 1 DOSE PACK | Refills: 0 | Status: SHIPPED | OUTPATIENT
Start: 2020-08-12 | End: 2021-09-29

## 2020-08-18 RX ORDER — FLUOXETINE HYDROCHLORIDE 40 MG/1
40 CAPSULE ORAL DAILY
Qty: 30 CAP | Refills: 0 | Status: SHIPPED | OUTPATIENT
Start: 2020-08-18 | End: 2020-08-24 | Stop reason: SDUPTHER

## 2020-08-18 NOTE — TELEPHONE ENCOUNTER
PCP: Matti Basilio MD    Last appt: Visit date not found  Future Appointments   Date Time Provider Tom Molina   9/8/2020  3:00 PM Matti Basilio MD [facility] BS AMB       Requested Prescriptions     Pending Prescriptions Disp Refills    FLUoxetine (PROzac) 40 mg capsule 30 Cap 3     Sig: Take 1 Cap by mouth daily.

## 2020-09-28 NOTE — PROGRESS NOTES
HISTORY OF PRESENT ILLNESS  Marni Dumont is a 63 y.o. female. HPI     Last here 1/17/20 Pt is here for routine care.        She c/o blackhead under arm   Discussed seeing derm to get it removed     Lov, she had stopped being compliant with a lot of her meds due to stressful family situation regarding her son with possession of marijuana and having to go to detention because of this but is now back to being compliant     BP today is 128/67  BP at home 127-130/80-85  Now back on losartan 50mg and HCTZ 25mg     Wt is 154 lbs - down 1 lbs x lov   Discussed diet and weight loss      reviewed labs    Ordered labs     Pt saw Dr. Vince Bender (derm) for sores  Pt has not seen this physician recently     Pt has seen Dr. Taylor Edmondson (uro-gyn) for recurrent hematuria  Last visit was 6/4/18  Per pt, a lesion was found in her bladder, had a FISH test and a CT scan  Reports all normal in hte end   Pt went to [facility] for CT scan, per pt this was nl  Per pt, she discussed having urodynamics test and possible bulking procedure for leakage  However pt says she was going to hold off on this for now d/t costs     Pt saw Dr Jorge Prabhakar (ortho) for R shoulder pain  Last visit was 9/4/18   Her sx improved      She continues on lipitor 80mg daily for cholesterol      She is taking prozac 40 mg and no longer taking wellbutrin 150 mg for depression   Prozac is working quite well happy with dose  Pt has xanax to use prn (twice weekly at most) as well, works well, discussed using this occasionally       Pt is noncompliant with CPAP qhs for JUAN ANTONIO  She is in process of getting an apt with Dr Ino Mendoza (sleep) and getting repeat study      pt is now following with Dr Elvia Enriquez (cardio)   Last visit was 4/9/19      Pt has restarted smoking   Now smoking about 0.75 ppd   She will be starting chantix for smoking cesstion   CT lung cancer screen prev not covered, pt would not like this reordered today , will let me know     ACP not on file.  SDM is her . Provided information in the past.       PREVENTIVE:    Colonoscopy: 3/12/19 with Dr Roberto Castillo, 5 year repeat   Pap: Dr. Cristiane Cardona , h/o hysterectomy and BSO in her 31's for endometriosis, due reminded  Mammogram: , negative, due ordered   Dexa: due, declines for now, wants to wait till age 63, ordered, reminded again  Tdap: 2012  Pneumovax: not yet needed  Shingrix: first round will get today 20  Flu shot: 19 , will get today 20  Eye exam: 10/17, [employer] at [facility], due reminded  A1c:  5.8,  5.8,  5.4,  5.4,  5.5  5.5   Lipids:  LDL 272  Hep C Screenin/24/15, negative  EK18 nsr  CT lung cancer screen: not covered, pt will hold off  AAA: completed 18 on CT abd, negative     Patient Active Problem List    Diagnosis Date Noted    Depression 2012    Anxiety 2012    Hyperlipidemia 2012    HTN (hypertension) 2012    Microscopic hematuria 2012    Fatty liver 2012    JUAN ANTONIO on CPAP 2012     Current Outpatient Medications   Medication Sig Dispense Refill    nystatin (MYCOSTATIN) powder aaa tid prn 60 g 0    hydroCHLOROthiazide (HYDRODIURIL) 25 mg tablet TAKE 1 TABLET BY MOUTH DAILY 30 Tab 0    losartan (COZAAR) 50 mg tablet TAKE 1 TABLET BY MOUTH DAILY 30 Tab 0    FLUoxetine (PROzac) 40 mg capsule Take 1 Cap by mouth daily. 15 Cap 0    varenicline (CHANTIX STARTER GAETANO) 0.5 mg (11)- 1 mg (42) DsPk Use as directed. 1 Dose Pack 0    atorvastatin (LIPITOR) 80 mg tablet Daily 90 Tab 0    ALPRAZolam (XANAX) 0.25 mg tablet TAKE 1 TABLET BY MOUTH EVERY EVENING AS NEEDED 20 Tab 0    PSEUDOEPHEDRINE/ACETAMINOPHEN (TYLENOL SINUS PO) Take  by mouth daily as needed.        Past Surgical History:   Procedure Laterality Date    HX BREAST BIOPSY Right     20 years ago    HX HYSTERECTOMY      HX ORTHOPAEDIC      right foot surgery    HX TONSILLECTOMY        Lab Results   Component Value Date/Time WBC 10.0 12/17/2019 07:49 AM    HGB 15.6 12/17/2019 07:49 AM    HCT 47.6 (H) 12/17/2019 07:49 AM    PLATELET 932 96/35/1686 07:49 AM    MCV 92 12/17/2019 07:49 AM     Lab Results   Component Value Date/Time    Cholesterol, total 368 (H) 12/17/2019 07:49 AM    HDL Cholesterol 70 12/17/2019 07:49 AM    LDL, calculated 272 (H) 12/17/2019 07:49 AM    Triglyceride 130 12/17/2019 07:49 AM     Lab Results   Component Value Date/Time    GFR est non-AA 87 12/17/2019 07:49 AM    GFR est  12/17/2019 07:49 AM    Creatinine 0.74 12/17/2019 07:49 AM    BUN 12 12/17/2019 07:49 AM    Sodium 141 12/17/2019 07:49 AM    Potassium 3.8 12/17/2019 07:49 AM    Chloride 100 12/17/2019 07:49 AM    CO2 21 12/17/2019 07:49 AM        Review of Systems   Respiratory: Negative for shortness of breath. Cardiovascular: Negative for chest pain. Physical Exam  Constitutional:       General: She is not in acute distress. Appearance: Normal appearance. She is not ill-appearing, toxic-appearing or diaphoretic. HENT:      Head: Normocephalic and atraumatic. Right Ear: External ear normal.      Left Ear: External ear normal.   Eyes:      General:         Right eye: No discharge. Left eye: No discharge. Conjunctiva/sclera: Conjunctivae normal.      Pupils: Pupils are equal, round, and reactive to light. Neck:      Musculoskeletal: Normal range of motion and neck supple. Vascular: No carotid bruit. Cardiovascular:      Rate and Rhythm: Normal rate and regular rhythm. Pulses: Normal pulses. Heart sounds: Normal heart sounds. No murmur. No friction rub. No gallop. Pulmonary:      Effort: No respiratory distress. Breath sounds: Normal breath sounds. No wheezing or rales. Chest:      Chest wall: No tenderness. Abdominal:      General: Abdomen is flat. There is no distension. Palpations: Abdomen is soft. There is no mass. Tenderness: There is no abdominal tenderness.  There is no guarding or rebound. Hernia: No hernia is present. Musculoskeletal: Normal range of motion. Right lower leg: No edema. Left lower leg: No edema. Skin:     General: Skin is warm and dry. Neurological:      Mental Status: She is alert and oriented to person, place, and time. Mental status is at baseline. Coordination: Coordination normal.      Gait: Gait normal.   Psychiatric:         Mood and Affect: Mood normal.         Behavior: Behavior normal.         ASSESSMENT and PLAN    ICD-10-CM ICD-9-CM    1. Physical exam       Shingrix and flu shot today weight at goal labs ordered declines CT lung cancer screen working on smoking cessation will be starting Chantix eye exam is due colonoscopy is up-to-date pelvic mammogram is due bone density she will get at age 74       Z00.00 V70.9    2. Essential hypertension  I10 401.9 LIPID PANEL   Controlled losartan hydrochlorothiazide   METABOLIC PANEL, COMPREHENSIVE   3. Fatty liver  K76.0 571.8 LIPID PANEL   Diet controlled work on weight loss   METABOLIC PANEL, COMPREHENSIVE   4. JUAN ANTONIO on CPAP  G47.33 327.23 LIPID PANEL   Not compliant with CPAP in process of getting a sleep study X35.32 G81.7 METABOLIC PANEL, COMPREHENSIVE   5. Pure hypercholesterolemia  E78.00 272.0 LIPID PANEL   Now on Lipitor check lipids adjust dose as needed   METABOLIC PANEL, COMPREHENSIVE   6. Reactive depression  F32.9 300.4 LIPID PANEL   Controlled Prozac 40 mg   METABOLIC PANEL, COMPREHENSIVE   7. Anxiety  F41.9 300.00 LIPID PANEL   Controlled on Prozac 40 mg rarely take Xanax   METABOLIC PANEL, COMPREHENSIVE   8. Breast screening  Z12.39 V76.10 CYNDI 3D TRESSA W MAMMO BI SCREENING INCL CAD      LIPID PANEL      METABOLIC PANEL, COMPREHENSIVE           Scribed by Ahsan Aguilar of [address] [place] [address] [address], as dictated by Dr. Peter Nieves. Current diagnosis and concerns discussed with pt at length.  Pt understands risks and benefits or current treatment plan and medications, and accepts the treatment and medication with any possible risks. Pt asks appropriate questions, which were answered. Pt was instructed to call with any concerns or problems. I have reviewed the note documented by the scribe. The services provided are my own.   The documentation is accurate

## 2020-09-29 ENCOUNTER — OFFICE VISIT (OUTPATIENT)
Dept: INTERNAL MEDICINE CLINIC | Age: 65
End: 2020-09-29
Payer: COMMERCIAL

## 2020-09-29 VITALS
HEART RATE: 76 BPM | OXYGEN SATURATION: 98 % | DIASTOLIC BLOOD PRESSURE: 67 MMHG | RESPIRATION RATE: 16 BRPM | BODY MASS INDEX: 25.79 KG/M2 | WEIGHT: 154.8 LBS | TEMPERATURE: 98.1 F | SYSTOLIC BLOOD PRESSURE: 128 MMHG | HEIGHT: 65 IN

## 2020-09-29 DIAGNOSIS — F41.9 ANXIETY: ICD-10-CM

## 2020-09-29 DIAGNOSIS — Z12.39 BREAST SCREENING: ICD-10-CM

## 2020-09-29 DIAGNOSIS — Z23 NEEDS FLU SHOT: ICD-10-CM

## 2020-09-29 DIAGNOSIS — Z00.00 PHYSICAL EXAM: Primary | ICD-10-CM

## 2020-09-29 DIAGNOSIS — Z99.89 OSA ON CPAP: ICD-10-CM

## 2020-09-29 DIAGNOSIS — Z23 ENCOUNTER FOR IMMUNIZATION: ICD-10-CM

## 2020-09-29 DIAGNOSIS — I10 ESSENTIAL HYPERTENSION: ICD-10-CM

## 2020-09-29 DIAGNOSIS — F32.9 REACTIVE DEPRESSION: ICD-10-CM

## 2020-09-29 DIAGNOSIS — K76.0 FATTY LIVER: ICD-10-CM

## 2020-09-29 DIAGNOSIS — G47.33 OSA ON CPAP: ICD-10-CM

## 2020-09-29 DIAGNOSIS — E78.00 PURE HYPERCHOLESTEROLEMIA: ICD-10-CM

## 2020-09-29 PROCEDURE — 90471 IMMUNIZATION ADMIN: CPT

## 2020-09-29 PROCEDURE — 90750 HZV VACC RECOMBINANT IM: CPT

## 2020-09-29 PROCEDURE — 99396 PREV VISIT EST AGE 40-64: CPT

## 2020-09-29 PROCEDURE — 90686 IIV4 VACC NO PRSV 0.5 ML IM: CPT

## 2020-09-29 RX ORDER — NYSTATIN 100000 [USP'U]/G
POWDER TOPICAL
Qty: 60 G | Refills: 0 | Status: SHIPPED | OUTPATIENT
Start: 2020-09-29 | End: 2022-05-13 | Stop reason: SDUPTHER

## 2020-10-14 RX ORDER — LOSARTAN POTASSIUM 50 MG/1
TABLET ORAL
Qty: 30 TAB | Refills: 0 | Status: SHIPPED | OUTPATIENT
Start: 2020-10-14 | End: 2020-11-14

## 2020-10-14 RX ORDER — HYDROCHLOROTHIAZIDE 25 MG/1
TABLET ORAL
Qty: 30 TAB | Refills: 0 | Status: SHIPPED | OUTPATIENT
Start: 2020-10-14 | End: 2020-11-14

## 2021-01-13 RX ORDER — FLUOXETINE HYDROCHLORIDE 40 MG/1
CAPSULE ORAL
Qty: 90 CAP | Refills: 0 | Status: SHIPPED | OUTPATIENT
Start: 2021-01-13 | End: 2021-03-23 | Stop reason: SDUPTHER

## 2021-02-24 RX ORDER — HYDROCHLOROTHIAZIDE 25 MG/1
TABLET ORAL
Qty: 30 TAB | Refills: 0 | Status: SHIPPED | OUTPATIENT
Start: 2021-02-24 | End: 2021-03-23 | Stop reason: SDUPTHER

## 2021-02-24 NOTE — TELEPHONE ENCOUNTER
Future Appointments:  Future Appointments   Date Time Provider Tom Lami   3/23/2021  2:30 PM Dione Shah MD [facility] BS AMB        Last Appointment With Me:  9/29/2020     Requested Prescriptions     Pending Prescriptions Disp Refills    hydroCHLOROthiazide (HYDRODIURIL) 25 mg tablet 90 Tab 0     Sig: TAKE 1 TABLET BY MOUTH DAILY

## 2021-03-01 RX ORDER — LOSARTAN POTASSIUM 50 MG/1
TABLET ORAL
Qty: 90 TAB | Refills: 0 | Status: SHIPPED | OUTPATIENT
Start: 2021-03-01 | End: 2021-05-22

## 2021-03-01 NOTE — TELEPHONE ENCOUNTER
Future Appointments:  Future Appointments   Date Time Provider Tom Molina   3/23/2021  2:30 PM Diane Velasquez MD [facility] BS AMB        Last Appointment With Me:  9/29/2020     Requested Prescriptions     Pending Prescriptions Disp Refills    losartan (COZAAR) 50 mg tablet 90 Tab 0

## 2021-03-19 NOTE — PROGRESS NOTES
HISTORY OF PRESENT ILLNESS  Mike Hunter is a 63 y.o. female. HPI     Last here 1/17/20 Pt is here for routine care.        She picks her skin   Discussed barriers to stop herself from doing this  Discussed stockings up to her knees and gloves   Discussed benadryl or zyrtec for itch  Discussed moisturizer and avoiding hot showers    BP today is controlled, 123/62  BP at home 130/80   Now back on losartan 50mg and HCTZ 25mg     Wt is 152 lbs - down 2 lbs x lov    Discussed diet and weight loss      reminded pt to complete labs      Pt saw Dr. Judith Snell (derm) for sores  Pt has not seen this physician recently     Pt has seen Dr. Adam Foy (uro-gyn) for recurrent hematuria  Last visit was 6/4/18  Per pt, a lesion was found in her bladder, had a FISH test and a CT scan  Reports all normal in hte end   Pt went to [facility] for CT scan, per pt this was nl  Will no longer be f/u      Pt saw Dr Hodan Nolan (ortho) for R shoulder pain  Last visit was 9/4/18   Her sx improved      She continues on lipitor 80mg daily for cholesterol      She is taking prozac 40 mg  for depression , working well happy with dose 3/21  Pt has xanax to use prn (twice weekly at most) as well, works well, discussed using this occasionally       Pt is noncompliant with CPAP qhs for JUAN ANTONIO  She is in process of getting an apt with Dr Michelle Concepcion (sleep) and getting repeat study - hasn't been able to schedule yet      pt is now following with Dr Belgica Srinivasan (cardio)   Last visit was 4/9/19     Pt is smoking  Now smoking about 0.5 ppd   Chantix did not help   Counseled on cessation   CT lung cancer screen prev not covered, ordered today     She works at [employer] not on file. SDM is her .   Provided information in the past.       PREVENTIVE:    Colonoscopy: 3/12/19 with Dr Adrianna Olivas, 5 year repeat   Pap: Dr. Mariana De Paz 1/21, h/o hysterectomy and BSO in her 31's for endometriosis   Mammogram: 1/19, negative, due ordered   Dexa: due, declines for now, wants to wait till age 63, ordered, reminded again  Tdap: 2012  Pneumovax: not yet needed  Shingrix: 20, will get 2nd today 21  Flu shot:  20  Eye exam: 10/17, [employer] at [facility], due reminded  A1c:  5.8,  5.8,  5.4,  5.4,  5.5  5.5   Lipids:  LDL 272  Hep C Screenin/24/15, negative  EK18 nsr  CT lung cancer screen ordered  AAA: completed 18 on CT abd, negative   Covid: completed both (moderna)    Patient Active Problem List    Diagnosis Date Noted    Depression 2012    Anxiety 2012    Hyperlipidemia 2012    HTN (hypertension) 2012    Microscopic hematuria 2012    Fatty liver 2012    JUAN ANTONIO on CPAP 2012     Current Outpatient Medications   Medication Sig Dispense Refill    atorvastatin (LIPITOR) 80 mg tablet TAKE 1 TABLET BY MOUTH EVERY DAY 90 Tab 1    FLUoxetine (PROzac) 40 mg capsule TAKE 1 CAPSULE BY MOUTH DAILY 90 Cap 1    hydroCHLOROthiazide (HYDRODIURIL) 25 mg tablet TAKE 1 TABLET BY MOUTH DAILY 90 Tab 1    losartan (COZAAR) 50 mg tablet qd 90 Tab 0    ALPRAZolam (XANAX) 0.25 mg tablet TAKE 1 TABLET BY MOUTH EVERY EVENING AS NEEDED 20 Tab 1    nystatin (MYCOSTATIN) powder aaa tid prn 60 g 0    PSEUDOEPHEDRINE/ACETAMINOPHEN (TYLENOL SINUS PO) Take  by mouth daily as needed.  varenicline (CHANTIX STARTER GAETANO) 0.5 mg (11)- 1 mg (42) DsPk Use as directed.  1 Dose Pack 0     Past Surgical History:   Procedure Laterality Date    HX BREAST BIOPSY Right     20 years ago    HX HYSTERECTOMY      HX ORTHOPAEDIC      right foot surgery    HX TONSILLECTOMY        Lab Results   Component Value Date/Time    WBC 10.0 2019 07:49 AM    HGB 15.6 2019 07:49 AM    HCT 47.6 (H) 2019 07:49 AM    PLATELET 833  07:49 AM    MCV 92 2019 07:49 AM     Lab Results   Component Value Date/Time    Cholesterol, total 368 (H) 2019 07:49 AM    HDL Cholesterol 70 12/17/2019 07:49 AM    LDL, calculated 272 (H) 12/17/2019 07:49 AM    Triglyceride 130 12/17/2019 07:49 AM     Lab Results   Component Value Date/Time    GFR est non-AA 87 12/17/2019 07:49 AM    GFR est  12/17/2019 07:49 AM    Creatinine 0.74 12/17/2019 07:49 AM    BUN 12 12/17/2019 07:49 AM    Sodium 141 12/17/2019 07:49 AM    Potassium 3.8 12/17/2019 07:49 AM    Chloride 100 12/17/2019 07:49 AM    CO2 21 12/17/2019 07:49 AM        Review of Systems   Respiratory: Negative for shortness of breath. Cardiovascular: Negative for chest pain. Physical Exam  Constitutional:       General: She is not in acute distress. Appearance: Normal appearance. She is not ill-appearing, toxic-appearing or diaphoretic. HENT:      Head: Normocephalic and atraumatic. Right Ear: External ear normal.      Left Ear: External ear normal.   Eyes:      General:         Right eye: No discharge. Left eye: No discharge. Conjunctiva/sclera: Conjunctivae normal.      Pupils: Pupils are equal, round, and reactive to light. Neck:      Musculoskeletal: Normal range of motion and neck supple. Cardiovascular:      Rate and Rhythm: Normal rate and regular rhythm. Pulses: Normal pulses. Heart sounds: Normal heart sounds. No murmur. No friction rub. No gallop. Pulmonary:      Effort: No respiratory distress. Breath sounds: Normal breath sounds. No wheezing or rales. Chest:      Chest wall: No tenderness. Musculoskeletal: Normal range of motion. Right lower leg: No edema. Left lower leg: No edema. Skin:     General: Skin is warm and dry. Neurological:      Mental Status: She is alert and oriented to person, place, and time. Mental status is at baseline. Coordination: Coordination normal.      Gait: Gait normal.   Psychiatric:         Mood and Affect: Mood normal.         Behavior: Behavior normal.         ASSESSMENT and PLAN    ICD-10-CM ICD-9-CM    1. Essential hypertension         Blood pressure well controlled on losartan 50 mg and hydrochlorothiazide 25 mg daily check metabolic panel labs are overdue     I10 401.9 LIPID PANEL      METABOLIC PANEL, COMPREHENSIVE      CBC W/O DIFF      TSH 3RD GENERATION      HEMOGLOBIN A1C WITH EAG      LIPID PANEL      METABOLIC PANEL, COMPREHENSIVE      CBC W/O DIFF      TSH 3RD GENERATION      HEMOGLOBIN A1C WITH EAG   2. JUAN ANTONIO on CPAP  G47.33 327.23 LIPID PANEL    T79.87 E11.2 METABOLIC PANEL, COMPREHENSIVE      CBC W/O DIFF   Not compliant with CPAP still needs sleep study completed   TSH 3RD GENERATION      HEMOGLOBIN A1C WITH EAG      LIPID PANEL      METABOLIC PANEL, COMPREHENSIVE      CBC W/O DIFF      TSH 3RD GENERATION      HEMOGLOBIN A1C WITH EAG   3. Reactive depression  F32.9 300.4 LIPID PANEL      METABOLIC PANEL, COMPREHENSIVE   Controlled on Prozac continue no change dose   CBC W/O DIFF      TSH 3RD GENERATION      HEMOGLOBIN A1C WITH EAG      LIPID PANEL      METABOLIC PANEL, COMPREHENSIVE      CBC W/O DIFF      TSH 3RD GENERATION      HEMOGLOBIN A1C WITH EAG   4. Pure hypercholesterolemia  E78.00 272.0 LIPID PANEL      METABOLIC PANEL, COMPREHENSIVE   Controlled on Lipitor in the past   CBC W/O DIFF      TSH 3RD GENERATION   Check lipids adjust dose as needed overdue on labs   HEMOGLOBIN A1C WITH EAG      LIPID PANEL      METABOLIC PANEL, COMPREHENSIVE      CBC W/O DIFF      TSH 3RD GENERATION      HEMOGLOBIN A1C WITH EAG   5. Anxiety  F41.9 300.00 LIPID PANEL      METABOLIC PANEL, COMPREHENSIVE   Controlled on Prozac uses Xanax infrequently   CBC W/O DIFF      TSH 3RD GENERATION      HEMOGLOBIN A1C WITH EAG      LIPID PANEL      METABOLIC PANEL, COMPREHENSIVE      CBC W/O DIFF      TSH 3RD GENERATION      HEMOGLOBIN A1C WITH EAG   6.  Smoking  F17.200 305.1 LIPID PANEL      METABOLIC PANEL, COMPREHENSIVE   Chantix did not work needs lung cancer screen ordered working on tapering down   CBC W/O DIFF      TSH 3RD GENERATION      HEMOGLOBIN A1C WITH EAG      LIPID PANEL      METABOLIC PANEL, COMPREHENSIVE      CBC W/O DIFF      TSH 3RD GENERATION      HEMOGLOBIN A1C WITH EAG   7. Breast screening  Z12.39 V76.10 CYNDI MAMMO BI SCREENING INCL CAD      LIPID PANEL      METABOLIC PANEL, COMPREHENSIVE      CBC W/O DIFF      TSH 3RD GENERATION      HEMOGLOBIN A1C WITH EAG      LIPID PANEL      METABOLIC PANEL, COMPREHENSIVE   Mammogram due   CBC W/O DIFF      TSH 3RD GENERATION      HEMOGLOBIN A1C WITH EAG   8. Personal history of nicotine dependence  Z87.891 V15.82 CYNDI MAMMO BI SCREENING INCL CAD      CT LOW DOSE LUNG CANCER SCREENING      LIPID PANEL   CT ordered   METABOLIC PANEL, COMPREHENSIVE      CBC W/O DIFF      TSH 3RD GENERATION      HEMOGLOBIN A1C WITH EAG      LIPID PANEL      METABOLIC PANEL, COMPREHENSIVE      CBC W/O DIFF      TSH 3RD GENERATION      HEMOGLOBIN A1C WITH EAG           Scribed by Zoie Sanchez [address], as dictated by Dr. Shelbi Gamble. Current diagnosis and concerns discussed with pt at length. Pt understands risks and benefits or current treatment plan and medications, and accepts the treatment and medication with any possible risks. Pt asks appropriate questions, which were answered. Pt was instructed to call with any concerns or problems. I have reviewed the note documented by the scribe. The services provided are my own.   The documentation is accurate

## 2021-03-23 ENCOUNTER — OFFICE VISIT (OUTPATIENT)
Dept: INTERNAL MEDICINE CLINIC | Age: 66
End: 2021-03-23
Payer: COMMERCIAL

## 2021-03-23 VITALS
WEIGHT: 152 LBS | DIASTOLIC BLOOD PRESSURE: 62 MMHG | BODY MASS INDEX: 25.33 KG/M2 | OXYGEN SATURATION: 100 % | HEART RATE: 66 BPM | RESPIRATION RATE: 16 BRPM | TEMPERATURE: 97.5 F | HEIGHT: 65 IN | SYSTOLIC BLOOD PRESSURE: 123 MMHG

## 2021-03-23 DIAGNOSIS — F17.200 SMOKING: ICD-10-CM

## 2021-03-23 DIAGNOSIS — F32.9 REACTIVE DEPRESSION: ICD-10-CM

## 2021-03-23 DIAGNOSIS — Z87.891 PERSONAL HISTORY OF NICOTINE DEPENDENCE: ICD-10-CM

## 2021-03-23 DIAGNOSIS — Z12.39 BREAST SCREENING: ICD-10-CM

## 2021-03-23 DIAGNOSIS — Z99.89 OSA ON CPAP: ICD-10-CM

## 2021-03-23 DIAGNOSIS — F41.9 ANXIETY: ICD-10-CM

## 2021-03-23 DIAGNOSIS — I10 ESSENTIAL HYPERTENSION: Primary | ICD-10-CM

## 2021-03-23 DIAGNOSIS — G47.33 OSA ON CPAP: ICD-10-CM

## 2021-03-23 DIAGNOSIS — E78.00 PURE HYPERCHOLESTEROLEMIA: ICD-10-CM

## 2021-03-23 DIAGNOSIS — Z23 ENCOUNTER FOR IMMUNIZATION: ICD-10-CM

## 2021-03-23 PROCEDURE — 90750 HZV VACC RECOMBINANT IM: CPT | Performed by: INTERNAL MEDICINE

## 2021-03-23 PROCEDURE — 90471 IMMUNIZATION ADMIN: CPT | Performed by: INTERNAL MEDICINE

## 2021-03-23 PROCEDURE — 99214 OFFICE O/P EST MOD 30 MIN: CPT | Performed by: INTERNAL MEDICINE

## 2021-03-23 RX ORDER — FLUOXETINE HYDROCHLORIDE 40 MG/1
CAPSULE ORAL
Qty: 90 CAP | Refills: 1 | Status: SHIPPED | OUTPATIENT
Start: 2021-03-23 | End: 2021-10-27

## 2021-03-23 RX ORDER — HYDROCHLOROTHIAZIDE 25 MG/1
TABLET ORAL
Qty: 90 TAB | Refills: 1 | Status: SHIPPED | OUTPATIENT
Start: 2021-03-23 | End: 2021-09-17

## 2021-03-23 RX ORDER — ATORVASTATIN CALCIUM 80 MG/1
TABLET, FILM COATED ORAL
Qty: 90 TAB | Refills: 1 | Status: SHIPPED | OUTPATIENT
Start: 2021-03-23 | End: 2021-11-24

## 2021-03-24 LAB
ALBUMIN SERPL-MCNC: 4.6 G/DL (ref 3.8–4.8)
ALBUMIN/GLOB SERPL: 1.8 {RATIO} (ref 1.2–2.2)
ALP SERPL-CCNC: 78 IU/L (ref 39–117)
ALT SERPL-CCNC: 23 IU/L (ref 0–32)
AST SERPL-CCNC: 21 IU/L (ref 0–40)
BILIRUB SERPL-MCNC: 0.5 MG/DL (ref 0–1.2)
BUN SERPL-MCNC: 17 MG/DL (ref 8–27)
BUN/CREAT SERPL: 21 (ref 12–28)
CALCIUM SERPL-MCNC: 9.3 MG/DL (ref 8.7–10.3)
CHLORIDE SERPL-SCNC: 98 MMOL/L (ref 96–106)
CHOLEST SERPL-MCNC: 225 MG/DL (ref 100–199)
CO2 SERPL-SCNC: 23 MMOL/L (ref 20–29)
CREAT SERPL-MCNC: 0.82 MG/DL (ref 0.57–1)
ERYTHROCYTE [DISTWIDTH] IN BLOOD BY AUTOMATED COUNT: 11.6 % (ref 11.7–15.4)
EST. AVERAGE GLUCOSE BLD GHB EST-MCNC: 108 MG/DL
GLOBULIN SER CALC-MCNC: 2.5 G/DL (ref 1.5–4.5)
GLUCOSE SERPL-MCNC: 81 MG/DL (ref 65–99)
HBA1C MFR BLD: 5.4 % (ref 4.8–5.6)
HCT VFR BLD AUTO: 42.9 % (ref 34–46.6)
HDLC SERPL-MCNC: 88 MG/DL
HGB BLD-MCNC: 14.4 G/DL (ref 11.1–15.9)
LDLC SERPL CALC-MCNC: 125 MG/DL (ref 0–99)
MCH RBC QN AUTO: 31.4 PG (ref 26.6–33)
MCHC RBC AUTO-ENTMCNC: 33.6 G/DL (ref 31.5–35.7)
MCV RBC AUTO: 94 FL (ref 79–97)
PLATELET # BLD AUTO: 322 X10E3/UL (ref 150–450)
POTASSIUM SERPL-SCNC: 3.7 MMOL/L (ref 3.5–5.2)
PROT SERPL-MCNC: 7.1 G/DL (ref 6–8.5)
RBC # BLD AUTO: 4.59 X10E6/UL (ref 3.77–5.28)
SODIUM SERPL-SCNC: 137 MMOL/L (ref 134–144)
TRIGL SERPL-MCNC: 70 MG/DL (ref 0–149)
TSH SERPL DL<=0.005 MIU/L-ACNC: 1.68 UIU/ML (ref 0.45–4.5)
VLDLC SERPL CALC-MCNC: 12 MG/DL (ref 5–40)
WBC # BLD AUTO: 8.7 X10E3/UL (ref 3.4–10.8)

## 2021-07-15 DIAGNOSIS — F41.9 ANXIETY: ICD-10-CM

## 2021-07-16 RX ORDER — ALPRAZOLAM 0.25 MG/1
TABLET ORAL
Qty: 20 TABLET | Refills: 0 | Status: SHIPPED | OUTPATIENT
Start: 2021-07-16 | End: 2021-09-29 | Stop reason: SDUPTHER

## 2021-08-18 RX ORDER — LOSARTAN POTASSIUM 50 MG/1
TABLET ORAL
Qty: 90 TABLET | Refills: 0 | Status: SHIPPED | OUTPATIENT
Start: 2021-08-18 | End: 2021-11-24

## 2021-09-17 RX ORDER — HYDROCHLOROTHIAZIDE 25 MG/1
TABLET ORAL
Qty: 90 TABLET | Refills: 1 | Status: SHIPPED | OUTPATIENT
Start: 2021-09-17 | End: 2022-03-24

## 2021-09-28 NOTE — PROGRESS NOTES
HISTORY OF PRESENT ILLNESS  Sissy Tracy is a 61 y.o. female. HPI     Last here 3/23/21. Pt is here for routine care.      She mentions that she had 1 isolated episode nauseous, lightheaded, sweaty, weak when doing yard work recently happened several weeks ago resolved  Denies CP shortness of breath etc.   discussed that this sounds like her BP dropped     BP today is 137/75  She is not monitoring at home discussed monitoring   now back on losartan 50mg and HCTZ 25mg     Wt is 158 lbs, up 6 lbs x lov  Discussed diet and weight loss     Reviewed labs  Will get labs today     Pt saw Dr. Argueta (derm) for sores  Pt has not seen this physician recently     Pt has seen Dr. Radha Friedman (uro-gyn) for recurrent hematuria  Last visit was 6/4/18  Per pt, a lesion was found in her bladder, had a FISH test and a CT scan  Reports all normal in hte end   Pt went to [facility] scan, per pt this was nl  Will no longer be f/u      Pt saw Dr Horace Grayson (ortho) for R shoulder pain and acute foot pain  Last visit was 4/02/21  Her sx improved      She continues on lipitor 80mg daily for cholesterol   Discussed adding zetia       She is taking prozac 40 mg  for depression , working well happy with dose 9/21  Pt has xanax to use prn (twice weekly at most) as well, works well, discussed using this occasionally       Pt is noncompliant with CPAP qhs for JUAN ANTONIO  She is in process of getting an apt with Dr James Hale (sleep) and getting repeat study - hasn't been able to schedule yet, discussed this again today      pt is now following with Dr Brianna Solis (cardio) in the past not recently  Last visit was 4/9/19     Pt is smoking  Now smoking about 0.5 ppd   Chantix did not help, she had bad dreams not interested in this  Counseled on cessation   CT lung cancer screen ordered, reminded     She works at [employer] not on file. SDM is her .   Provided information in the past.       PREVENTIVE:    Colonoscopy: 3/12/19 with Dr Zacarias Syed, 5 year repeat   Pap: Dr. Lilia Horvath , h/o hysterectomy and BSO in her 29's for endometriosis   Mammogram: , negative, due ordered, reminded  Dexa: due, declines for now, wants to wait till age 63, ordered, reminded again  Tdap: 2012  Pneumovax: will get today 21  Shingrix: 1st dose 20, 2nd dose 21  Flu shot: 21  Eye exam: 10/17, [facility] [place], due reminded she plans on scheduling  A1c:  5.8,  5.8,  5.4,  5.4,  5.5  5.5 3/21 5.4  Lipids: 3/21   Hep C Screenin/24/15, negative  EK18 nsr  CT lung cancer screen ordered  AAA: completed 18 on CT abd, negative   Covid: both (moderna)    Patient Active Problem List    Diagnosis Date Noted    Depression 2012    Anxiety 2012    Hyperlipidemia 2012    HTN (hypertension) 2012    Microscopic hematuria 2012    Fatty liver 2012    JUAN ANTONIO on CPAP 2012     Current Outpatient Medications   Medication Sig Dispense Refill    meloxicam (MOBIC) 15 mg tablet 1 PO qDay for 2 weeks, then 1 PO qDay PRN Pain. Take with food.  ALPRAZolam (XANAX) 0.25 mg tablet qhs prn 20 Tablet 2    ezetimibe (ZETIA) 10 mg tablet Take 1 Tablet by mouth daily.  90 Tablet 1    hydroCHLOROthiazide (HYDRODIURIL) 25 mg tablet TAKE 1 TABLET BY MOUTH DAILY 90 Tablet 1    losartan (COZAAR) 50 mg tablet TAKE 1 TABLET BY MOUTH EVERY DAY 90 Tablet 0    atorvastatin (LIPITOR) 80 mg tablet TAKE 1 TABLET BY MOUTH EVERY DAY 90 Tab 1    FLUoxetine (PROzac) 40 mg capsule TAKE 1 CAPSULE BY MOUTH DAILY 90 Cap 1    nystatin (MYCOSTATIN) powder aaa tid prn 60 g 0     Past Surgical History:   Procedure Laterality Date    HX BREAST BIOPSY Right     20 years ago    HX HYSTERECTOMY      HX ORTHOPAEDIC      right foot surgery    HX TONSILLECTOMY        Lab Results   Component Value Date/Time    WBC 8.7 2021 03:12 PM    HGB 14.4 2021 03:12 PM    HCT 42.9 03/23/2021 03:12 PM    PLATELET 983 43/00/1785 03:12 PM    MCV 94 03/23/2021 03:12 PM     Lab Results   Component Value Date/Time    Cholesterol, total 225 (H) 03/23/2021 03:12 PM    HDL Cholesterol 88 03/23/2021 03:12 PM    LDL, calculated 125 (H) 03/23/2021 03:12 PM    LDL, calculated 272 (H) 12/17/2019 07:49 AM    Triglyceride 70 03/23/2021 03:12 PM     Lab Results   Component Value Date/Time    GFR est non-AA 76 03/23/2021 03:12 PM    GFR est AA 88 03/23/2021 03:12 PM    Creatinine 0.82 03/23/2021 03:12 PM    BUN 17 03/23/2021 03:12 PM    Sodium 137 03/23/2021 03:12 PM    Potassium 3.7 03/23/2021 03:12 PM    Chloride 98 03/23/2021 03:12 PM    CO2 23 03/23/2021 03:12 PM        Review of Systems   Respiratory: Negative for shortness of breath. Cardiovascular: Negative for chest pain. Physical Exam  Constitutional:       General: She is not in acute distress. Appearance: Normal appearance. She is not ill-appearing, toxic-appearing or diaphoretic. HENT:      Head: Normocephalic and atraumatic. Right Ear: External ear normal.      Left Ear: External ear normal.   Eyes:      General:         Right eye: No discharge. Left eye: No discharge. Conjunctiva/sclera: Conjunctivae normal.      Pupils: Pupils are equal, round, and reactive to light. Neck:      Vascular: No carotid bruit. Cardiovascular:      Rate and Rhythm: Normal rate and regular rhythm. Heart sounds: No murmur heard. No friction rub. No gallop. Pulmonary:      Effort: No respiratory distress. Breath sounds: Normal breath sounds. No wheezing or rales. Chest:      Chest wall: No tenderness. Musculoskeletal:         General: Normal range of motion. Cervical back: Normal range of motion and neck supple. Skin:     General: Skin is warm and dry. Neurological:      Mental Status: She is alert and oriented to person, place, and time. Mental status is at baseline.       Coordination: Coordination normal. Gait: Gait normal.   Psychiatric:         Mood and Affect: Mood normal.         Behavior: Behavior normal.         ASSESSMENT and PLAN    ICD-10-CM ICD-9-CM    1. Physical exam      O19.76 R76.8 METABOLIC PANEL, COMPREHENSIVE      HEMOGLOBIN A1C WITH EAG   Due for CT lung cancer screening mammogram discussed all of this with her    Colonoscopy up-to-date    Pelvic exam up-to-date    Flu shot and pneumonia vaccine given today    Eye exam    Covid vaccine up-to-date    Labs ordered   METABOLIC PANEL, COMPREHENSIVE      HEMOGLOBIN A1C WITH EAG   2. Anxiety  F41.9 300.00 ALPRAZolam (XANAX) 0.25 mg tablet      METABOLIC PANEL, COMPREHENSIVE   Controlled on Prozac uses Xanax infrequently   HEMOGLOBIN A1C WITH EAG      METABOLIC PANEL, COMPREHENSIVE      HEMOGLOBIN A1C WITH EAG   3. Essential hypertension  N31 136.0 METABOLIC PANEL, COMPREHENSIVE      HEMOGLOBIN A1C WITH EAG   Controlled losartan hydrochlorothiazide continue   METABOLIC PANEL, COMPREHENSIVE      HEMOGLOBIN A1C WITH EAG   4. JUAN ANTONIO on CPAP  O79.29 681.33 METABOLIC PANEL, COMPREHENSIVE    Z99.89 V46.8 HEMOGLOBIN A1C WITH EAG   Not wearing CPAP needs to follow-up with sleep specialist   METABOLIC PANEL, COMPREHENSIVE      HEMOGLOBIN A1C WITH EAG   5. Pure hypercholesterolemia  L76.86 091.2 METABOLIC PANEL, COMPREHENSIVE      HEMOGLOBIN A1C WITH EAG   On max dose Lipitor add Zetia   METABOLIC PANEL, COMPREHENSIVE      HEMOGLOBIN A1C WITH EAG   6. Reactive depression  A47.6 571.9 METABOLIC PANEL, COMPREHENSIVE      HEMOGLOBIN A1C WITH EAG   Controlled Prozac   METABOLIC PANEL, COMPREHENSIVE      HEMOGLOBIN A1C WITH EAG   7. IFG (impaired fasting glucose)  J23.63 691.17 METABOLIC PANEL, COMPREHENSIVE      HEMOGLOBIN A1C WITH EAG   Check R6S   METABOLIC PANEL, COMPREHENSIVE      HEMOGLOBIN A1C WITH EAG        Scribed by Sathya Mention of [place], as dictated by Dr. Lolita Ross. Current diagnosis and concerns discussed with pt at length.  Pt understands risks and benefits or current treatment plan and medications, and accepts the treatment and medication with any possible risks. Pt asks appropriate questions, which were answered. Pt was instructed to call with any concerns or problems. I have reviewed the note documented by the scribe. The services provided are my own.   The documentation is accurate

## 2021-09-29 ENCOUNTER — OFFICE VISIT (OUTPATIENT)
Dept: INTERNAL MEDICINE CLINIC | Age: 66
End: 2021-09-29
Payer: COMMERCIAL

## 2021-09-29 VITALS
DIASTOLIC BLOOD PRESSURE: 75 MMHG | OXYGEN SATURATION: 98 % | SYSTOLIC BLOOD PRESSURE: 137 MMHG | TEMPERATURE: 97.9 F | BODY MASS INDEX: 26.33 KG/M2 | HEART RATE: 74 BPM | RESPIRATION RATE: 16 BRPM | WEIGHT: 158 LBS | HEIGHT: 65 IN

## 2021-09-29 DIAGNOSIS — G47.33 OSA ON CPAP: ICD-10-CM

## 2021-09-29 DIAGNOSIS — F32.9 REACTIVE DEPRESSION: ICD-10-CM

## 2021-09-29 DIAGNOSIS — Z99.89 OSA ON CPAP: ICD-10-CM

## 2021-09-29 DIAGNOSIS — R73.01 IFG (IMPAIRED FASTING GLUCOSE): ICD-10-CM

## 2021-09-29 DIAGNOSIS — Z23 ENCOUNTER FOR IMMUNIZATION: ICD-10-CM

## 2021-09-29 DIAGNOSIS — I10 ESSENTIAL HYPERTENSION: ICD-10-CM

## 2021-09-29 DIAGNOSIS — Z00.00 PHYSICAL EXAM: Primary | ICD-10-CM

## 2021-09-29 DIAGNOSIS — E78.00 PURE HYPERCHOLESTEROLEMIA: ICD-10-CM

## 2021-09-29 DIAGNOSIS — F41.9 ANXIETY: ICD-10-CM

## 2021-09-29 DIAGNOSIS — Z23 NEEDS FLU SHOT: ICD-10-CM

## 2021-09-29 PROCEDURE — 90471 IMMUNIZATION ADMIN: CPT | Performed by: INTERNAL MEDICINE

## 2021-09-29 PROCEDURE — 90732 PPSV23 VACC 2 YRS+ SUBQ/IM: CPT | Performed by: INTERNAL MEDICINE

## 2021-09-29 PROCEDURE — 99396 PREV VISIT EST AGE 40-64: CPT | Performed by: INTERNAL MEDICINE

## 2021-09-29 PROCEDURE — 90686 IIV4 VACC NO PRSV 0.5 ML IM: CPT | Performed by: INTERNAL MEDICINE

## 2021-09-29 PROCEDURE — 90472 IMMUNIZATION ADMIN EACH ADD: CPT | Performed by: INTERNAL MEDICINE

## 2021-09-29 RX ORDER — EZETIMIBE 10 MG/1
10 TABLET ORAL DAILY
Qty: 90 TABLET | Refills: 1 | Status: SHIPPED | OUTPATIENT
Start: 2021-09-29

## 2021-09-29 RX ORDER — MELOXICAM 15 MG/1
TABLET ORAL
COMMUNITY
Start: 2020-12-07

## 2021-09-29 RX ORDER — ALPRAZOLAM 0.25 MG/1
TABLET ORAL
Qty: 20 TABLET | Refills: 2 | Status: SHIPPED | OUTPATIENT
Start: 2021-09-29 | End: 2022-05-13 | Stop reason: SDUPTHER

## 2021-09-30 LAB
ALBUMIN SERPL-MCNC: 4.3 G/DL (ref 3.8–4.8)
ALBUMIN/GLOB SERPL: 2 {RATIO} (ref 1.2–2.2)
ALP SERPL-CCNC: 81 IU/L (ref 44–121)
ALT SERPL-CCNC: 23 IU/L (ref 0–32)
AST SERPL-CCNC: 22 IU/L (ref 0–40)
BILIRUB SERPL-MCNC: 0.4 MG/DL (ref 0–1.2)
BUN SERPL-MCNC: 15 MG/DL (ref 8–27)
BUN/CREAT SERPL: 26 (ref 12–28)
CALCIUM SERPL-MCNC: 8.9 MG/DL (ref 8.7–10.3)
CHLORIDE SERPL-SCNC: 105 MMOL/L (ref 96–106)
CO2 SERPL-SCNC: 25 MMOL/L (ref 20–29)
CREAT SERPL-MCNC: 0.57 MG/DL (ref 0.57–1)
EST. AVERAGE GLUCOSE BLD GHB EST-MCNC: 105 MG/DL
GLOBULIN SER CALC-MCNC: 2.1 G/DL (ref 1.5–4.5)
GLUCOSE SERPL-MCNC: 95 MG/DL (ref 65–99)
HBA1C MFR BLD: 5.3 % (ref 4.8–5.6)
POTASSIUM SERPL-SCNC: 4.5 MMOL/L (ref 3.5–5.2)
PROT SERPL-MCNC: 6.4 G/DL (ref 6–8.5)
SODIUM SERPL-SCNC: 141 MMOL/L (ref 134–144)

## 2021-10-19 ENCOUNTER — HOSPITAL ENCOUNTER (OUTPATIENT)
Dept: MAMMOGRAPHY | Age: 66
Discharge: HOME | End: 2021-10-19
Attending: INTERNAL MEDICINE
Payer: COMMERCIAL

## 2021-10-19 ENCOUNTER — APPOINTMENT (OUTPATIENT)
Dept: CT IMAGING | Age: 66
End: 2021-10-19
Attending: INTERNAL MEDICINE
Payer: COMMERCIAL

## 2021-10-19 DIAGNOSIS — Z87.891 PERSONAL HISTORY OF NICOTINE DEPENDENCE: ICD-10-CM

## 2021-10-19 DIAGNOSIS — Z12.39 BREAST SCREENING: ICD-10-CM

## 2021-10-19 PROCEDURE — 77063 BREAST TOMOSYNTHESIS BI: CPT

## 2021-10-27 RX ORDER — FLUOXETINE HYDROCHLORIDE 40 MG/1
CAPSULE ORAL
Qty: 90 CAPSULE | Refills: 1 | Status: SHIPPED | OUTPATIENT
Start: 2021-10-27 | End: 2022-04-22

## 2021-11-24 RX ORDER — LOSARTAN POTASSIUM 50 MG/1
TABLET ORAL
Qty: 90 TABLET | Refills: 0 | Status: SHIPPED | OUTPATIENT
Start: 2021-11-24 | End: 2022-01-27

## 2021-11-24 RX ORDER — ATORVASTATIN CALCIUM 80 MG/1
TABLET, FILM COATED ORAL
Qty: 90 TABLET | Refills: 1 | Status: SHIPPED | OUTPATIENT
Start: 2021-11-24 | End: 2022-05-13 | Stop reason: SDUPTHER

## 2022-01-27 RX ORDER — LOSARTAN POTASSIUM 50 MG/1
TABLET ORAL
Qty: 90 TABLET | Refills: 0 | Status: SHIPPED | OUTPATIENT
Start: 2022-01-27 | End: 2022-05-13 | Stop reason: SDUPTHER

## 2022-03-09 ENCOUNTER — PATIENT MESSAGE (OUTPATIENT)
Dept: INTERNAL MEDICINE CLINIC | Age: 67
End: 2022-03-09

## 2022-03-09 DIAGNOSIS — Z99.89 OSA ON CPAP: Primary | ICD-10-CM

## 2022-03-09 DIAGNOSIS — G47.33 OSA ON CPAP: Primary | ICD-10-CM

## 2022-03-14 NOTE — TELEPHONE ENCOUNTER
Patience Harden 3/11/2022 10:47 AM EST      ----- Message -----  From: Betina Clinton  Sent: 3/11/2022 10:26 AM EST  To: [facility] Nurse Pool  Subject: Sleep study     I saw a sleep doctor years ago but I can't remember who it was. I would like to go to [facility]. Dr. Radha Robles.  [phone]

## 2022-03-14 NOTE — TELEPHONE ENCOUNTER
BRIDGER per Dr. Quigley Covert referral placed and faxed to [phone] w/confirmation. Informed pt via [employer].   closing

## 2022-05-13 ENCOUNTER — VIRTUAL VISIT (OUTPATIENT)
Dept: INTERNAL MEDICINE CLINIC | Age: 67
End: 2022-05-13
Payer: COMMERCIAL

## 2022-05-13 DIAGNOSIS — K76.0 FATTY LIVER: ICD-10-CM

## 2022-05-13 DIAGNOSIS — E78.00 PURE HYPERCHOLESTEROLEMIA: ICD-10-CM

## 2022-05-13 DIAGNOSIS — Z99.89 OSA ON CPAP: Primary | ICD-10-CM

## 2022-05-13 DIAGNOSIS — G47.33 OSA ON CPAP: Primary | ICD-10-CM

## 2022-05-13 DIAGNOSIS — F41.9 ANXIETY: ICD-10-CM

## 2022-05-13 DIAGNOSIS — I10 PRIMARY HYPERTENSION: ICD-10-CM

## 2022-05-13 DIAGNOSIS — Z87.891 PERSONAL HISTORY OF NICOTINE DEPENDENCE: ICD-10-CM

## 2022-05-13 DIAGNOSIS — R73.01 IFG (IMPAIRED FASTING GLUCOSE): ICD-10-CM

## 2022-05-13 DIAGNOSIS — F32.9 REACTIVE DEPRESSION: ICD-10-CM

## 2022-05-13 PROCEDURE — 99214 OFFICE O/P EST MOD 30 MIN: CPT | Performed by: INTERNAL MEDICINE

## 2022-05-13 RX ORDER — ATORVASTATIN CALCIUM 80 MG/1
80 TABLET, FILM COATED ORAL DAILY
Qty: 90 TABLET | Refills: 1 | Status: SHIPPED | OUTPATIENT
Start: 2022-05-13

## 2022-05-13 RX ORDER — FLUOXETINE 20 MG/1
TABLET ORAL
Qty: 90 TABLET | Refills: 1 | Status: SHIPPED | OUTPATIENT
Start: 2022-05-13

## 2022-05-13 RX ORDER — FLUOXETINE HYDROCHLORIDE 40 MG/1
40 CAPSULE ORAL DAILY
Qty: 90 CAPSULE | Refills: 1 | Status: SHIPPED | OUTPATIENT
Start: 2022-05-13

## 2022-05-13 RX ORDER — NYSTATIN 100000 [USP'U]/G
POWDER TOPICAL
Qty: 60 G | Refills: 0 | Status: SHIPPED | OUTPATIENT
Start: 2022-05-13

## 2022-05-13 RX ORDER — ALPRAZOLAM 0.25 MG/1
TABLET ORAL
Qty: 20 TABLET | Refills: 2 | Status: SHIPPED | OUTPATIENT
Start: 2022-05-13

## 2022-05-13 RX ORDER — LOSARTAN POTASSIUM 50 MG/1
50 TABLET ORAL DAILY
Qty: 90 TABLET | Refills: 1 | Status: SHIPPED | OUTPATIENT
Start: 2022-05-13

## 2022-05-13 RX ORDER — HYDROCHLOROTHIAZIDE 25 MG/1
25 TABLET ORAL DAILY
Qty: 90 TABLET | Refills: 1 | Status: SHIPPED | OUTPATIENT
Start: 2022-05-13

## 2022-05-28 LAB
BUN SERPL-MCNC: 14 MG/DL (ref 8–27)
BUN/CREAT SERPL: 19 (ref 12–28)
CALCIUM SERPL-MCNC: 9.3 MG/DL (ref 8.7–10.3)
CHLORIDE SERPL-SCNC: 99 MMOL/L (ref 96–106)
CHOLEST SERPL-MCNC: 277 MG/DL (ref 100–199)
CO2 SERPL-SCNC: 23 MMOL/L (ref 20–29)
CREAT SERPL-MCNC: 0.73 MG/DL (ref 0.57–1)
EGFR: 92 ML/MIN/1.73
ERYTHROCYTE [DISTWIDTH] IN BLOOD BY AUTOMATED COUNT: 11.7 % (ref 11.7–15.4)
EST. AVERAGE GLUCOSE BLD GHB EST-MCNC: 114 MG/DL
GLUCOSE SERPL-MCNC: 90 MG/DL (ref 65–99)
HBA1C MFR BLD: 5.6 % (ref 4.8–5.6)
HCT VFR BLD AUTO: 43.6 % (ref 34–46.6)
HDLC SERPL-MCNC: 77 MG/DL
HGB BLD-MCNC: 14.9 G/DL (ref 11.1–15.9)
LDLC SERPL CALC-MCNC: 185 MG/DL (ref 0–99)
MCH RBC QN AUTO: 31.4 PG (ref 26.6–33)
MCHC RBC AUTO-ENTMCNC: 34.2 G/DL (ref 31.5–35.7)
MCV RBC AUTO: 92 FL (ref 79–97)
PLATELET # BLD AUTO: 303 X10E3/UL (ref 150–450)
POTASSIUM SERPL-SCNC: 4 MMOL/L (ref 3.5–5.2)
RBC # BLD AUTO: 4.74 X10E6/UL (ref 3.77–5.28)
SODIUM SERPL-SCNC: 137 MMOL/L (ref 134–144)
TRIGL SERPL-MCNC: 91 MG/DL (ref 0–149)
TSH SERPL DL<=0.005 MIU/L-ACNC: 1.34 UIU/ML (ref 0.45–4.5)
VLDLC SERPL CALC-MCNC: 15 MG/DL (ref 5–40)
WBC # BLD AUTO: 6.5 X10E3/UL (ref 3.4–10.8)

## 2022-05-28 NOTE — PROGRESS NOTES
Please call patient back about results  Cholesterol quite elevate,d suspect noncompliance with lipitor--make sure she is taking her lipitor  Rest labs fine

## 2022-05-31 NOTE — PROGRESS NOTES
Received call from pt  Received two pt identifiers  Pt informed per Dr. Jayden Garcia Cholesterol quite elevated  Pt states does not take lipitor every night. Advised pt to work on compliance. Rest labs are okay   Pt verbalizes understanding of the instructions and has no further questions at this time.

## 2022-06-22 ENCOUNTER — PATIENT MESSAGE (OUTPATIENT)
Dept: INTERNAL MEDICINE CLINIC | Age: 67
End: 2022-06-22

## 2022-06-22 NOTE — LETTER
6/22/2022 3:51 PM    Ms. Roman [id number]    Dear Care Provider    Please fax us the most recent imaging report so that we may update the patient's records for continuity of care. Our fax number: [phone].     Patient:   Gonzalez Moulton  1955      Sincerely,      Andres La MD

## 2022-06-22 NOTE — TELEPHONE ENCOUNTER
From: Kinsey Greco  To: Stu Phillips MD  Sent: 6/22/2022 8:25 AM EDT  Subject: Lung CT    I had my Lung CT screening last Thursday. Just wondering if you have gotten the results.

## 2022-11-09 NOTE — PROGRESS NOTES
HISTORY OF PRESENT ILLNESS  Ruth Degroot is a 74 y.o. female. HPI  Last here 5/13/22. Pt is here for routine care. Has a history of hypertension  BP today 133/92, will repeat today  BP at home 140/80  on losartan 50mg and HCTZ 25mg  She did not take this today discussed she needs to take it every day she comes into the visit     Wt today is 161 lbs, up 5 lbs   Discussed diet and weight loss     Reviewed labs  Ordered labs     Pt saw Dr. Hector Hollins (derm) for sores  Pt has not seen this physician recently, discussed scheduling f/u for skin check     Pt has seen Dr. Aide Sam (uro-gyn) for recurrent hematuria  Last visit was 6/4/18  Per pt, a lesion was found in her bladder, had a FISH test and a CT scan  Reports all normal in the end   Will no longer be f/u      Pt saw Dr Beverly Sweet (ortho) for R shoulder pain and acute foot pain  Last visit was 4/02/21    She saw Dr. Audra Gaines (ortho) for lumbar and hip pain 10/17/22  Reviewed note:  ASSESSMENT  1. Lumbar pain   2. Hip pain     PLAN  Patient seen today for above diagnoses. Discussed diagnosis and treatment options with the patient. Agree upon plan with patient listed below.    Work Restrictions: No restriction  At this point time will move for the following conservative treatment plan for the patient's low back pain with concern for radicular symptoms in the right leg  Home therapy exercises provided today  High-dose steroid taper  Diclofenac for swelling    Reviewed XR lumbar spine 10/17/22:  Impression: X-ray lumbar spine shows multilevel degenerative changes   Reviewed XR hip R 10/17/22:  Impression: X-ray right hip shows minimal osteoarthritis     Has hyperlipidemia  She continues on lipitor 80mg daily for cholesterol was not compliant last check last visit she is not taking it every day we will repeat lipids  Previously discussed adding zetia       She is taking prozac 60 mg (increased from 40 mg) for depression this is working well happy with this dose  Pt has xanax to use prn (twice weekly at most) as well usually just once a week  Doing well on this dose        She is now following w/ Dr. Wenceslao solis (sleep) for JUAN ANTONIO  She completed an at home sleep study recently in 2022  Now has a CPAP, compliant     Reviewed CT scan 22 at [facility], showed severe coronary calcification, no nodules, no aneurysm     Pt is now following with Dr Sophie Corral (cardio)--I sent her back due to severe coronary calcification found on CT of the lungs  Last visit was 22  Had an echo and carotid doppler, 2022 these were nl per pt      Pt is smoking, has been for 40 years  Smoking about 0.5 ppd not interested in quitting  Chantix did not help, she had bad dreams not interested in this  Counseled on cessation   CT lung cancer screen was completed at [facility] 22, no nodules     She works at [employer]    She has medicare part A only and commercial insurance. Notes her mother had an aortic aneurysm (not abdominal)     ACP not on file. SDM is her .   Provided information in the past.       PREVENTIVE:    AAA: no fmhx, not needed  Colonoscopy: 3/12/19 with Dr Joaquin Wilde, 5 year repeat, due 3/24  Pap: Dr. Naye Mcgrath , h/o hysterectomy and BSO in her 's for endometriosis, none further   Mammogram: 10/19/21 negative, due ordered   Dexa: due ordered   Tdap: 2012, due, will get at next visit   Pneumovax: 21  Prevnar 13: will get 22  Shingrix: 1st dose 20, 2nd dose 21  Flu shot: 21, will get 22  Eye exam:  [facility] at [facility], 2022  A1c:  5.5  5.5 3/21 5.4  5.3  5.6  Lipids:    Hep C Screenin/24/15, negative  EK18 nsr  CT lung cancer screen   AAA: completed 18 on CT abd, negative   Covid: 3 doses (moderna)     Patient Active Problem List    Diagnosis Date Noted    Depression 2012    Anxiety 2012    Hyperlipidemia 09/21/2012    HTN (hypertension) 09/21/2012    Microscopic hematuria 09/21/2012    Fatty liver 09/21/2012    JUAN ANTONIO on CPAP 09/21/2012     Current Outpatient Medications   Medication Sig Dispense Refill    nystatin (MYCOSTATIN) powder aaa tid prn 60 g 0    losartan (COZAAR) 50 mg tablet Take 1 Tablet by mouth daily. 90 Tablet 1    hydroCHLOROthiazide (HYDRODIURIL) 25 mg tablet Take 1 Tablet by mouth daily. 90 Tablet 1    FLUoxetine (PROzac) 40 mg capsule Take 1 Capsule by mouth daily. 90 Capsule 1    atorvastatin (LIPITOR) 80 mg tablet Take 1 Tablet by mouth daily. 90 Tablet 1    ALPRAZolam (XANAX) 0.25 mg tablet qhs prn 20 Tablet 2    FLUoxetine (PROzac) 20 mg tablet Take one daily in combination with 40mg for total of 60mg daily 90 Tablet 1    meloxicam (MOBIC) 15 mg tablet 1 PO qDay for 2 weeks, then 1 PO qDay PRN Pain. Take with food. ezetimibe (ZETIA) 10 mg tablet Take 1 Tablet by mouth daily.  (Patient not taking: Reported on 5/13/2022) 90 Tablet 1     Past Surgical History:   Procedure Laterality Date    HX BREAST BIOPSY Right     20 plus yrs ago negative    HX HYSTERECTOMY      HX ORTHOPAEDIC      right foot surgery    HX TONSILLECTOMY        Lab Results   Component Value Date/Time    WBC 6.5 05/27/2022 08:15 AM    HGB 14.9 05/27/2022 08:15 AM    HCT 43.6 05/27/2022 08:15 AM    PLATELET 507 02/35/5647 08:15 AM    MCV 92 05/27/2022 08:15 AM     Lab Results   Component Value Date/Time    Cholesterol, total 277 (H) 05/27/2022 08:15 AM    HDL Cholesterol 77 05/27/2022 08:15 AM    LDL, calculated 185 (H) 05/27/2022 08:15 AM    LDL, calculated 272 (H) 12/17/2019 07:49 AM    Triglyceride 91 05/27/2022 08:15 AM     Lab Results   Component Value Date/Time    GFR est non-AA 98 09/29/2021 12:00 AM    GFR est  09/29/2021 12:00 AM    Creatinine 0.73 05/27/2022 08:15 AM    BUN 14 05/27/2022 08:15 AM    Sodium 137 05/27/2022 08:15 AM    Potassium 4.0 05/27/2022 08:15 AM    Chloride 99 05/27/2022 08:15 AM    CO2 23 05/27/2022 08:15 AM        Review of Systems   Respiratory:  Negative for shortness of breath. Cardiovascular:  Negative for chest pain. Physical Exam  Constitutional:       General: She is not in acute distress. Appearance: She is not ill-appearing, toxic-appearing or diaphoretic. HENT:      Head: Normocephalic and atraumatic. Right Ear: External ear normal.      Left Ear: External ear normal.   Eyes:      General:         Right eye: No discharge. Left eye: No discharge. Conjunctiva/sclera: Conjunctivae normal.      Pupils: Pupils are equal, round, and reactive to light. Cardiovascular:      Rate and Rhythm: Normal rate and regular rhythm. Heart sounds: No murmur heard. No friction rub. No gallop. Pulmonary:      Effort: No respiratory distress. Breath sounds: Normal breath sounds. No wheezing or rales. Chest:      Chest wall: No tenderness. Musculoskeletal:         General: Normal range of motion. Cervical back: Normal.   Skin:     General: Skin is warm and dry. Neurological:      Mental Status: She is oriented to person, place, and time. Mental status is at baseline. Coordination: Coordination normal.      Gait: Gait normal.   Psychiatric:         Mood and Affect: Mood normal.         Behavior: Behavior normal.       ASSESSMENT and PLAN    ICD-10-CM ICD-9-CM    1. Physical exam  Z00.00 V70.9        Discussed diet and weight loss    Discussed colonoscopy will be due in March 2024    Mammogram has been ordered bone density ordered we will do Tdap next visit as we are doing a Prevnar 15 today and a flu shot today    Shingrix is up-to-date    She will schedule an eye exam    CT lung screen up-to-date   2. Encounter for screening mammogram for malignant neoplasm of breast  Z12.31 V76.12 CYNDI MAMMO BI SCREENING INCL CAD      LIPID PANEL      METABOLIC PANEL, COMPREHENSIVE      CK      LIPID PANEL      METABOLIC PANEL, COMPREHENSIVE      CK      3. Postmenopausal state  Z78.0 V49.81 DEXA BONE DENSITY STUDY AXIAL      LIPID PANEL      METABOLIC PANEL, COMPREHENSIVE      CK   DEXA ordered   LIPID PANEL      METABOLIC PANEL, COMPREHENSIVE      CK      4. Primary hypertension  I10 401.9 LIPID PANEL      METABOLIC PANEL, COMPREHENSIVE      CK      LIPID PANEL      METABOLIC PANEL, COMPREHENSIVE      CK      5. JUAN ANTONIO on CPAP  G47.33 327.23 LIPID PANEL    U50.58 Q16.9 METABOLIC PANEL, COMPREHENSIVE      CK      LIPID PANEL   Compliant with CPAP   METABOLIC PANEL, COMPREHENSIVE      CK      6. Anxiety  F41.9 300.00 LIPID PANEL      METABOLIC PANEL, COMPREHENSIVE      CK   Uses Xanax sporadically   LIPID PANEL      METABOLIC PANEL, COMPREHENSIVE      CK      7. Pure hypercholesterolemia  E78.00 272.0 LIPID PANEL      METABOLIC PANEL, COMPREHENSIVE      CK      LIPID PANEL   Elevated last check because of noncompliance with her statin she is now taking her Lipitor we will repeat lipids and adjust dose as needed   METABOLIC PANEL, COMPREHENSIVE      CK      8. Fatty liver  K76.0 571.8 LIPID PANEL      METABOLIC PANEL, COMPREHENSIVE      CK   Work on weight loss check LFTs   LIPID PANEL      METABOLIC PANEL, COMPREHENSIVE      CK      9. Reactive depression  F32.9 300.4 LIPID PANEL      METABOLIC PANEL, COMPREHENSIVE      CK      LIPID PANEL   Improved with Prozac   METABOLIC PANEL, COMPREHENSIVE      CK      10 coronary calcification patient continues to smoke not interested in quitting she is now compliant with her statin she is seeing cardiology annually we will get notes for review  Depression screen reviewed and negative. Scribed by Robyn Francois, as dictated by Dr. Esperanza Juarez. Current diagnosis and concerns discussed with pt at length. Pt understands risks and benefits or current treatment plan and medications, and accepts the treatment and medication with any possible risks. Pt asks appropriate questions, which were answered.  Pt was instructed to call with any concerns or problems. I have reviewed the note documented by the scribe. The services provided are my own. The documentation is accurate.

## 2022-11-12 NOTE — PATIENT INSTRUCTIONS
Medicare Wellness Visit, Female     The best way to live healthy is to have a lifestyle where you eat a well-balanced diet, exercise regularly, limit alcohol use, and quit all forms of tobacco/nicotine, if applicable. Regular preventive services are another way to keep healthy. Preventive services (vaccines, screening tests, monitoring & exams) can help personalize your care plan, which helps you manage your own care. Screening tests can find health problems at the earliest stages, when they are easiest to treat. [facility] follows the current, evidence-based guidelines published by the [facility] Pratik Hobson ([facility]) when recommending preventive services for our patients. Because we follow these guidelines, sometimes recommendations change over time as research supports it. (For example, mammograms used to be recommended annually. Even though Medicare will still pay for an annual mammogram, the newer guidelines recommend a mammogram every two years for women of average risk). Of course, you and your doctor may decide to screen more often for some diseases, based on your risk and your co-morbidities (chronic disease you are already diagnosed with). Preventive services for you include:  - Medicare offers their members a free annual wellness visit, which is time for you and your primary care provider to discuss and plan for your preventive service needs. Take advantage of this benefit every year!  -All adults over the age of 72 should receive the recommended pneumonia vaccines. Current USPSTF guidelines recommend a series of two vaccines for the best pneumonia protection.   -All adults should have a flu vaccine yearly and a tetanus vaccine every 10 years.   -All adults age 48 and older should receive the shingles vaccines (series of two vaccines).       -All adults age 38-68 who are overweight should have a diabetes screening test once every three years.   -All adults born between [birth year] and 1965 should be screened once for Hepatitis C.  -Other screening tests and preventive services for persons with diabetes include: an eye exam to screen for diabetic retinopathy, a kidney function test, a foot exam, and stricter control over your cholesterol.   -Cardiovascular screening for adults with routine risk involves an electrocardiogram (ECG) at intervals determined by your doctor.   -Colorectal cancer screenings should be done for adults age 54-65 with no increased risk factors for colorectal cancer. There are a number of acceptable methods of screening for this type of cancer. Each test has its own benefits and drawbacks. Discuss with your doctor what is most appropriate for you during your annual wellness visit. The different tests include: colonoscopy (considered the best screening method), a fecal occult blood test, a fecal DNA test, and sigmoidoscopy.    -A bone mass density test is recommended when a woman turns 65 to screen for osteoporosis. This test is only recommended one time, as a screening. Some providers will use this same test as a disease monitoring tool if you already have osteoporosis. -Breast cancer screenings are recommended every other year for women of normal risk, age 54-69.  -Cervical cancer screenings for women over age 72 are only recommended with certain risk factors.      Here is a list of your current Health Maintenance items (your personalized list of preventive services) with a due date:  Health Maintenance Due   Topic Date Due    COVID-19 Vaccine (4 - Booster for Moderna series) 12/25/2021    Yearly Flu Vaccine (1) 08/01/2022    DTaP/Tdap/Td  (2 - Td or Tdap) 09/21/2022    Bone Mineral Density   Never done

## 2022-11-12 NOTE — PROGRESS NOTES
This is a \"Welcome to [employer]"  Initial Preventive Physical Examination (IPPE) providing Personalized Prevention Plan Services (Performed in the first 12 months of enrollment)    I have reviewed the patient's medical history in detail and updated the computerized patient record. Assessment/Plan   Education and counseling provided:  {Education OC:49347::\"DQO appropriate based on today's review and evaluation\"}    1. Welcome to Medicare preventive visit     Depression Risk Screen     3 most recent PHQ Screens 5/13/2022   Little interest or pleasure in doing things More than half the days   Feeling down, depressed, irritable, or hopeless More than half the days   Total Score PHQ 2 4       Alcohol & Drug Abuse Risk Screen    Do you average more than 1 drink per night or more than 7 drinks a week:  {Yes/No:047371}    On any one occasion in the past three months have you have had more than 3 drinks containing alcohol:  {Yes/No:107403}          Functional Ability and Level of Safety    Diet: {V Diet, Lifestyle:67237::\"No special diet\"}      Hearing: {Desc; hearing loss:57839::\"Hearing is good. \"}      Vision Screening:  Vision is {AWV Vision:33257::\"good. \"}  No results found. Activities of Daily Living: The home contains: {[facility] YBXNUL:08295::\"RG safety equipment. \"}  {Functional ADL's:63741::\"Patient does total self care\"}      Ambulation: {Patient ambulates:10105::\"with no difficulty\"}      Exercise level: {AWV exercise level:43348::\"moderately active\"}     Fall Risk Screen:  No flowsheet data found. Abuse Screen:  {Abuse Screen:19752::\"Patient is not abused\"}       Screening EKG   EKG order placed: {AWV YES NO:99659::\"Yes\"}    End of Life Planning   {AWV ACP NOTE:83571::\"Advanced care planning directives were discussed with the patient and /or family/caregiver. \"}     Health Maintenance Due     Health Maintenance Due   Topic Date Due    COVID-19 Vaccine (4 - Booster for Moderna series) 12/25/2021 Flu Vaccine (1) 08/01/2022    DTaP/Tdap/Td series (2 - Td or Tdap) 09/21/2022    Bone Densitometry (Dexa) Screening  Never done       Patient Care Team   Patient Care Team:  Lazarus Haagensen, MD as PCP - General (Internal Medicine Physician)  Lazarus Haagensen, MD as PCP - [facility]  Tavon Neumann MD (Gynecology)  Henrry Perez MD (Cardiovascular Disease Physician)    History     Past Medical History:   Diagnosis Date    Hypercholesterolemia     Hypertension     UTI (urinary tract infection)       Past Surgical History:   Procedure Laterality Date    HX BREAST BIOPSY Right     20 plus yrs ago negative    HX HYSTERECTOMY      HX ORTHOPAEDIC      right foot surgery    HX TONSILLECTOMY       Current Outpatient Medications   Medication Sig Dispense Refill    nystatin (MYCOSTATIN) powder aaa tid prn 60 g 0    losartan (COZAAR) 50 mg tablet Take 1 Tablet by mouth daily. 90 Tablet 1    hydroCHLOROthiazide (HYDRODIURIL) 25 mg tablet Take 1 Tablet by mouth daily. 90 Tablet 1    FLUoxetine (PROzac) 40 mg capsule Take 1 Capsule by mouth daily. 90 Capsule 1    atorvastatin (LIPITOR) 80 mg tablet Take 1 Tablet by mouth daily. 90 Tablet 1    ALPRAZolam (XANAX) 0.25 mg tablet qhs prn 20 Tablet 2    FLUoxetine (PROzac) 20 mg tablet Take one daily in combination with 40mg for total of 60mg daily 90 Tablet 1    meloxicam (MOBIC) 15 mg tablet 1 PO qDay for 2 weeks, then 1 PO qDay PRN Pain. Take with food. ezetimibe (ZETIA) 10 mg tablet Take 1 Tablet by mouth daily.  (Patient not taking: Reported on 5/13/2022) 90 Tablet 1     Allergies   Allergen Reactions    Lisinopril Cough       Family History   Problem Relation Age of Onset    Hypertension Mother     High Cholesterol Mother     Hypertension Father     High Cholesterol Father      Social History     Tobacco Use    Smoking status: Some Days     Packs/day: 0.50     Types: Cigarettes    Smokeless tobacco: Never    Tobacco comments:     4 cigarettes daily Substance Use Topics    Alcohol use: No          ACP not on file. SDM is her . Provided information in the past.         Colonoscopy: 3/12/19 with Dr Raj Blas, 5 year repeat   Pap: Dr. Valente Lugo , h/o hysterectomy and BSO in her 29's for endometriosis   Mammogram: 10/19/21 negative  Dexa: Will complete at age 74  Aaa screen    Tdap: 2012 due   Pneumovax: 21  Prevnar 20 due now  Shingrix: 1st dose 20, 2nd dose 21  Flu shot: 21    Eye exam:  [facility], 2021    A1c: ,   5.6  Lipids:    Hep C Screenin/24/15, negative    EK18 nsr  CT lung cancer screen ordered    AAA: completed 18 on CT abd, negative   Covid: three doses (moderna)      Medication reconciliation completed by MA and reviewed by me. Medical/surgical/social/family history reviewed and updated by me. Patient provided AVS and preventative screening table. Patient verbalized understanding of all information discussed.        Bharath Hyatt MD

## 2022-11-14 ENCOUNTER — OFFICE VISIT (OUTPATIENT)
Dept: INTERNAL MEDICINE CLINIC | Age: 67
End: 2022-11-14
Payer: COMMERCIAL

## 2022-11-14 VITALS
HEART RATE: 76 BPM | TEMPERATURE: 97.9 F | DIASTOLIC BLOOD PRESSURE: 84 MMHG | OXYGEN SATURATION: 98 % | SYSTOLIC BLOOD PRESSURE: 131 MMHG | BODY MASS INDEX: 26.89 KG/M2 | RESPIRATION RATE: 14 BRPM | WEIGHT: 161.4 LBS | HEIGHT: 65 IN

## 2022-11-14 DIAGNOSIS — E78.00 PURE HYPERCHOLESTEROLEMIA: ICD-10-CM

## 2022-11-14 DIAGNOSIS — Z00.00 PHYSICAL EXAM: Primary | ICD-10-CM

## 2022-11-14 DIAGNOSIS — Z23 NEEDS FLU SHOT: ICD-10-CM

## 2022-11-14 DIAGNOSIS — I10 PRIMARY HYPERTENSION: ICD-10-CM

## 2022-11-14 DIAGNOSIS — Z12.31 ENCOUNTER FOR SCREENING MAMMOGRAM FOR MALIGNANT NEOPLASM OF BREAST: ICD-10-CM

## 2022-11-14 DIAGNOSIS — F32.9 REACTIVE DEPRESSION: ICD-10-CM

## 2022-11-14 DIAGNOSIS — Z99.89 OSA ON CPAP: ICD-10-CM

## 2022-11-14 DIAGNOSIS — G47.33 OSA ON CPAP: ICD-10-CM

## 2022-11-14 DIAGNOSIS — Z23 ENCOUNTER FOR IMMUNIZATION: ICD-10-CM

## 2022-11-14 DIAGNOSIS — Z78.0 POSTMENOPAUSAL STATE: ICD-10-CM

## 2022-11-14 DIAGNOSIS — F41.9 ANXIETY: ICD-10-CM

## 2022-11-14 DIAGNOSIS — K76.0 FATTY LIVER: ICD-10-CM

## 2022-11-14 PROCEDURE — 90670 PCV13 VACCINE IM: CPT | Performed by: INTERNAL MEDICINE

## 2022-11-14 PROCEDURE — 99397 PER PM REEVAL EST PAT 65+ YR: CPT | Performed by: INTERNAL MEDICINE

## 2022-11-14 PROCEDURE — 90471 IMMUNIZATION ADMIN: CPT | Performed by: INTERNAL MEDICINE

## 2022-11-14 PROCEDURE — 90472 IMMUNIZATION ADMIN EACH ADD: CPT | Performed by: INTERNAL MEDICINE

## 2022-11-14 PROCEDURE — 90694 VACC AIIV4 NO PRSRV 0.5ML IM: CPT | Performed by: INTERNAL MEDICINE

## 2022-11-14 NOTE — PROGRESS NOTES
1. \"Have you been to the ER, urgent care clinic since your last visit? Hospitalized since your last visit? \" No    2. \"Have you seen or consulted any other health care providers outside of the [address] [place] [address] since your last visit? \" No     3. For patients aged 39-70: Has the patient had a colonoscopy / FIT/ Cologuard? Yes - Care Gap present. Most recent result on file      If the patient is female:    4. For patients aged 41-77: Has the patient had a mammogram within the past 2 years? No      5. For patients aged 21-65: Has the patient had a pap smear?  No

## 2022-11-15 LAB
ALBUMIN SERPL-MCNC: 4.7 G/DL (ref 3.8–4.8)
ALBUMIN/GLOB SERPL: 2.1 {RATIO} (ref 1.2–2.2)
ALP SERPL-CCNC: 98 IU/L (ref 44–121)
ALT SERPL-CCNC: 31 IU/L (ref 0–32)
AST SERPL-CCNC: 25 IU/L (ref 0–40)
BILIRUB SERPL-MCNC: 0.4 MG/DL (ref 0–1.2)
BUN SERPL-MCNC: 11 MG/DL (ref 8–27)
BUN/CREAT SERPL: 16 (ref 12–28)
CALCIUM SERPL-MCNC: 9.4 MG/DL (ref 8.7–10.3)
CHLORIDE SERPL-SCNC: 102 MMOL/L (ref 96–106)
CHOLEST SERPL-MCNC: 236 MG/DL (ref 100–199)
CK SERPL-CCNC: 79 U/L (ref 32–182)
CO2 SERPL-SCNC: 22 MMOL/L (ref 20–29)
CREAT SERPL-MCNC: 0.7 MG/DL (ref 0.57–1)
EGFR: 96 ML/MIN/1.73
GLOBULIN SER CALC-MCNC: 2.2 G/DL (ref 1.5–4.5)
GLUCOSE SERPL-MCNC: 101 MG/DL (ref 70–99)
HDLC SERPL-MCNC: 84 MG/DL
LDLC SERPL CALC-MCNC: 130 MG/DL (ref 0–99)
POTASSIUM SERPL-SCNC: 4.4 MMOL/L (ref 3.5–5.2)
PROT SERPL-MCNC: 6.9 G/DL (ref 6–8.5)
SODIUM SERPL-SCNC: 140 MMOL/L (ref 134–144)
TRIGL SERPL-MCNC: 126 MG/DL (ref 0–149)
VLDLC SERPL CALC-MCNC: 22 MG/DL (ref 5–40)

## 2022-11-16 RX ORDER — FLUOXETINE HYDROCHLORIDE 40 MG/1
40 CAPSULE ORAL DAILY
Qty: 90 CAPSULE | Refills: 1 | Status: SHIPPED | OUTPATIENT
Start: 2022-11-16

## 2022-11-16 RX ORDER — LOSARTAN POTASSIUM 50 MG/1
50 TABLET ORAL DAILY
Qty: 90 TABLET | Refills: 1 | Status: SHIPPED | OUTPATIENT
Start: 2022-11-16

## 2022-11-16 RX ORDER — FLUOXETINE 20 MG/1
TABLET ORAL
Qty: 90 TABLET | Refills: 1 | Status: SHIPPED | OUTPATIENT
Start: 2022-11-16

## 2022-11-16 RX ORDER — HYDROCHLOROTHIAZIDE 25 MG/1
25 TABLET ORAL DAILY
Qty: 90 TABLET | Refills: 1 | Status: SHIPPED | OUTPATIENT
Start: 2022-11-16

## 2023-01-19 RX ORDER — ATORVASTATIN CALCIUM 80 MG/1
80 TABLET, FILM COATED ORAL DAILY
Qty: 90 TABLET | Refills: 1 | Status: SHIPPED | OUTPATIENT
Start: 2023-01-19

## 2023-01-19 NOTE — TELEPHONE ENCOUNTER
PCP: José Antonio Cardona MD    Last appt: 11/14/2022  Future Appointments   Date Time Provider Tom Molina   6/5/2023  8:15 AM José Antonio Cardona MD [facility] BS AMB       Requested Prescriptions     Pending Prescriptions Disp Refills    atorvastatin (LIPITOR) 80 mg tablet 90 Tablet 1     Sig: Take 1 Tablet by mouth daily.

## 2023-02-07 DIAGNOSIS — F41.9 ANXIETY: ICD-10-CM

## 2023-02-07 RX ORDER — ALPRAZOLAM 0.25 MG/1
TABLET ORAL
Qty: 20 TABLET | Refills: 2 | Status: SHIPPED | OUTPATIENT
Start: 2023-02-07

## 2023-02-07 NOTE — TELEPHONE ENCOUNTER
PCP: Monique Orozco MD    Last appt: 11/14/2022  Future Appointments   Date Time Provider Tom Molina   6/5/2023  8:15 AM Monique Orozco MD [facility] BS AMB       Requested Prescriptions     Pending Prescriptions Disp Refills    ALPRAZolam (XANAX) 0.25 mg tablet 20 Tablet 2     Sig: qhs prn

## 2023-05-17 RX ORDER — FLUOXETINE 20 MG/1
TABLET ORAL
COMMUNITY
Start: 2022-11-16 | End: 2023-06-05 | Stop reason: SDUPTHER

## 2023-05-17 RX ORDER — MELOXICAM 15 MG/1
TABLET ORAL
COMMUNITY
Start: 2020-12-07

## 2023-05-17 RX ORDER — ATORVASTATIN CALCIUM 80 MG/1
80 TABLET, FILM COATED ORAL DAILY
COMMUNITY
Start: 2023-01-19 | End: 2023-06-05 | Stop reason: SDUPTHER

## 2023-05-17 RX ORDER — ALPRAZOLAM 0.25 MG/1
TABLET ORAL
COMMUNITY
Start: 2023-02-07 | End: 2023-06-05 | Stop reason: SDUPTHER

## 2023-05-17 RX ORDER — HYDROCHLOROTHIAZIDE 25 MG/1
25 TABLET ORAL DAILY
COMMUNITY
Start: 2022-11-16 | End: 2023-06-05 | Stop reason: SDUPTHER

## 2023-05-17 RX ORDER — NYSTATIN 100000 [USP'U]/G
POWDER TOPICAL
COMMUNITY
Start: 2022-05-13

## 2023-05-17 RX ORDER — EZETIMIBE 10 MG/1
10 TABLET ORAL DAILY
COMMUNITY
Start: 2021-09-29

## 2023-05-17 RX ORDER — LOSARTAN POTASSIUM 50 MG/1
50 TABLET ORAL DAILY
COMMUNITY
Start: 2022-11-16 | End: 2023-06-05 | Stop reason: SDUPTHER

## 2023-05-17 RX ORDER — FLUOXETINE HYDROCHLORIDE 40 MG/1
40 CAPSULE ORAL DAILY
COMMUNITY
Start: 2022-11-16 | End: 2023-06-05 | Stop reason: SDUPTHER

## 2023-05-30 ASSESSMENT — ENCOUNTER SYMPTOMS: SHORTNESS OF BREATH: 0

## 2023-06-01 ENCOUNTER — TELEPHONE (OUTPATIENT)
Age: 68
End: 2023-06-01

## 2023-06-05 ENCOUNTER — OFFICE VISIT (OUTPATIENT)
Age: 68
End: 2023-06-05
Payer: COMMERCIAL

## 2023-06-05 VITALS
TEMPERATURE: 97.9 F | HEART RATE: 82 BPM | SYSTOLIC BLOOD PRESSURE: 159 MMHG | OXYGEN SATURATION: 98 % | BODY MASS INDEX: 26.36 KG/M2 | WEIGHT: 158.2 LBS | HEIGHT: 65 IN | RESPIRATION RATE: 16 BRPM | DIASTOLIC BLOOD PRESSURE: 96 MMHG

## 2023-06-05 DIAGNOSIS — Z23 NEED FOR PROPHYLACTIC VACCINATION AGAINST DIPHTHERIA-TETANUS-PERTUSSIS (DTP): ICD-10-CM

## 2023-06-05 DIAGNOSIS — Z12.39 BREAST SCREENING: ICD-10-CM

## 2023-06-05 DIAGNOSIS — Z87.891 PERSONAL HISTORY OF NICOTINE DEPENDENCE: ICD-10-CM

## 2023-06-05 DIAGNOSIS — R23.8 PAPULE: ICD-10-CM

## 2023-06-05 DIAGNOSIS — G47.33 OSA ON CPAP: ICD-10-CM

## 2023-06-05 DIAGNOSIS — Z78.0 POST-MENOPAUSE: ICD-10-CM

## 2023-06-05 DIAGNOSIS — E78.2 MIXED HYPERLIPIDEMIA: ICD-10-CM

## 2023-06-05 DIAGNOSIS — K76.0 FATTY LIVER: ICD-10-CM

## 2023-06-05 DIAGNOSIS — R31.29 MICROSCOPIC HEMATURIA: ICD-10-CM

## 2023-06-05 DIAGNOSIS — R73.01 IFG (IMPAIRED FASTING GLUCOSE): ICD-10-CM

## 2023-06-05 DIAGNOSIS — Z99.89 OSA ON CPAP: ICD-10-CM

## 2023-06-05 DIAGNOSIS — F33.41 RECURRENT MAJOR DEPRESSIVE DISORDER, IN PARTIAL REMISSION (HCC): ICD-10-CM

## 2023-06-05 DIAGNOSIS — I10 PRIMARY HYPERTENSION: ICD-10-CM

## 2023-06-05 DIAGNOSIS — R39.9 UTI SYMPTOMS: Primary | ICD-10-CM

## 2023-06-05 DIAGNOSIS — F41.9 ANXIETY: ICD-10-CM

## 2023-06-05 DIAGNOSIS — Z11.4 ENCOUNTER FOR SCREENING FOR HIV: ICD-10-CM

## 2023-06-05 LAB
BILIRUBIN, URINE, POC: NEGATIVE
BLOOD URINE, POC: ABNORMAL
GLUCOSE URINE, POC: NEGATIVE
KETONES, URINE, POC: NEGATIVE
LEUKOCYTE ESTERASE, URINE, POC: NEGATIVE
NITRITE, URINE, POC: NEGATIVE
PH, URINE, POC: 7 (ref 4.6–8)
PROTEIN,URINE, POC: NEGATIVE
SPECIFIC GRAVITY, URINE, POC: 1.01 (ref 1–1.03)
URINALYSIS CLARITY, POC: ABNORMAL
URINALYSIS COLOR, POC: YELLOW
UROBILINOGEN, POC: ABNORMAL

## 2023-06-05 PROCEDURE — 90715 TDAP VACCINE 7 YRS/> IM: CPT | Performed by: INTERNAL MEDICINE

## 2023-06-05 PROCEDURE — 99214 OFFICE O/P EST MOD 30 MIN: CPT | Performed by: INTERNAL MEDICINE

## 2023-06-05 PROCEDURE — 3077F SYST BP >= 140 MM HG: CPT | Performed by: INTERNAL MEDICINE

## 2023-06-05 PROCEDURE — 90471 IMMUNIZATION ADMIN: CPT | Performed by: INTERNAL MEDICINE

## 2023-06-05 PROCEDURE — 81003 URINALYSIS AUTO W/O SCOPE: CPT | Performed by: INTERNAL MEDICINE

## 2023-06-05 PROCEDURE — 3079F DIAST BP 80-89 MM HG: CPT | Performed by: INTERNAL MEDICINE

## 2023-06-05 PROCEDURE — 1123F ACP DISCUSS/DSCN MKR DOCD: CPT | Performed by: INTERNAL MEDICINE

## 2023-06-05 RX ORDER — ATORVASTATIN CALCIUM 80 MG/1
80 TABLET, FILM COATED ORAL DAILY
Qty: 90 TABLET | Refills: 1 | Status: SHIPPED | OUTPATIENT
Start: 2023-06-05

## 2023-06-05 RX ORDER — LOSARTAN POTASSIUM 50 MG/1
50 TABLET ORAL DAILY
Qty: 90 TABLET | Refills: 1 | Status: SHIPPED | OUTPATIENT
Start: 2023-06-05

## 2023-06-05 RX ORDER — ALPRAZOLAM 0.25 MG/1
TABLET ORAL
Qty: 30 TABLET | Refills: 1 | Status: SHIPPED | OUTPATIENT
Start: 2023-06-05 | End: 2023-07-10

## 2023-06-05 RX ORDER — FLUOXETINE HYDROCHLORIDE 40 MG/1
40 CAPSULE ORAL DAILY
Qty: 90 CAPSULE | Refills: 1 | Status: SHIPPED | OUTPATIENT
Start: 2023-06-05

## 2023-06-05 RX ORDER — FLUOXETINE 20 MG/1
TABLET ORAL
Qty: 90 TABLET | Refills: 1 | Status: SHIPPED | OUTPATIENT
Start: 2023-06-05

## 2023-06-05 RX ORDER — HYDROCHLOROTHIAZIDE 25 MG/1
25 TABLET ORAL DAILY
Qty: 90 TABLET | Refills: 1 | Status: SHIPPED | OUTPATIENT
Start: 2023-06-05

## 2023-06-05 SDOH — ECONOMIC STABILITY: FOOD INSECURITY: WITHIN THE PAST 12 MONTHS, THE FOOD YOU BOUGHT JUST DIDN'T LAST AND YOU DIDN'T HAVE MONEY TO GET MORE.: NEVER TRUE

## 2023-06-05 SDOH — ECONOMIC STABILITY: FOOD INSECURITY: WITHIN THE PAST 12 MONTHS, YOU WORRIED THAT YOUR FOOD WOULD RUN OUT BEFORE YOU GOT MONEY TO BUY MORE.: NEVER TRUE

## 2023-06-05 SDOH — ECONOMIC STABILITY: INCOME INSECURITY: HOW HARD IS IT FOR YOU TO PAY FOR THE VERY BASICS LIKE FOOD, HOUSING, MEDICAL CARE, AND HEATING?: NOT HARD AT ALL

## 2023-06-05 SDOH — ECONOMIC STABILITY: HOUSING INSECURITY
IN THE LAST 12 MONTHS, WAS THERE A TIME WHEN YOU DID NOT HAVE A STEADY PLACE TO SLEEP OR SLEPT IN A SHELTER (INCLUDING NOW)?: NO

## 2023-06-05 ASSESSMENT — PATIENT HEALTH QUESTIONNAIRE - PHQ9
SUM OF ALL RESPONSES TO PHQ QUESTIONS 1-9: 0
SUM OF ALL RESPONSES TO PHQ9 QUESTIONS 1 & 2: 0
1. LITTLE INTEREST OR PLEASURE IN DOING THINGS: 0
SUM OF ALL RESPONSES TO PHQ QUESTIONS 1-9: 0
2. FEELING DOWN, DEPRESSED OR HOPELESS: 0
SUM OF ALL RESPONSES TO PHQ QUESTIONS 1-9: 0
SUM OF ALL RESPONSES TO PHQ QUESTIONS 1-9: 0

## 2023-06-05 NOTE — PROGRESS NOTES
After obtaining consent, and per  verbal orders of Fede Martinez MD, injection of TDAP  given in Left deltoid by [facility], LPN. Patient instructed to remain in clinic for 20 minutes afterwards, and to report any adverse reaction to me immediately.

## 2023-06-05 NOTE — PROGRESS NOTES
1. \"Have you been to the ER, urgent care clinic since your last visit? Hospitalized since your last visit? \" No    2. \"Have you seen or consulted any other health care providers outside of the [address] [place] [address] since your last visit? \" No     3. For patients aged 39-70: Has the patient had a colonoscopy / FIT/ Cologuard? Yes - no Care Gap present      If the patient is female:    4. For patients aged 41-77: Has the patient had a mammogram within the past 2 years? Yes - no Care Gap present      5. For patients aged 21-65: Has the patient had a pap smear?  Yes - no Care Gap present

## 2023-06-06 LAB
ERYTHROCYTE [DISTWIDTH] IN BLOOD BY AUTOMATED COUNT: 11.8 % (ref 11.7–15.4)
EST. AVERAGE GLUCOSE BLD GHB EST-MCNC: 114 MG/DL
HBA1C MFR BLD: 5.6 % (ref 4.8–5.6)
HCT VFR BLD AUTO: 42.3 % (ref 34–46.6)
HGB BLD-MCNC: 14.8 G/DL (ref 11.1–15.9)
MCH RBC QN AUTO: 31.7 PG (ref 26.6–33)
MCHC RBC AUTO-ENTMCNC: 35 G/DL (ref 31.5–35.7)
MCV RBC AUTO: 91 FL (ref 79–97)
PLATELET # BLD AUTO: 322 X10E3/UL (ref 150–450)
RBC # BLD AUTO: 4.67 X10E6/UL (ref 3.77–5.28)
WBC # BLD AUTO: 7.8 X10E3/UL (ref 3.4–10.8)

## 2023-06-07 LAB
ALBUMIN SERPL-MCNC: 4.6 G/DL (ref 3.8–4.8)
ALBUMIN/GLOB SERPL: 2.3 {RATIO} (ref 1.2–2.2)
ALP SERPL-CCNC: 102 IU/L (ref 44–121)
ALT SERPL-CCNC: 27 IU/L (ref 0–32)
AST SERPL-CCNC: 23 IU/L (ref 0–40)
BILIRUB SERPL-MCNC: 0.6 MG/DL (ref 0–1.2)
BUN SERPL-MCNC: 11 MG/DL (ref 8–27)
BUN/CREAT SERPL: 15 (ref 12–28)
CALCIUM SERPL-MCNC: 8.9 MG/DL (ref 8.7–10.3)
CHLORIDE SERPL-SCNC: 102 MMOL/L (ref 96–106)
CHOLEST SERPL-MCNC: 206 MG/DL (ref 100–199)
CO2 SERPL-SCNC: 22 MMOL/L (ref 20–29)
CREAT SERPL-MCNC: 0.74 MG/DL (ref 0.57–1)
EGFRCR SERPLBLD CKD-EPI 2021: 90 ML/MIN/1.73
GLOBULIN SER CALC-MCNC: 2 G/DL (ref 1.5–4.5)
GLUCOSE SERPL-MCNC: 92 MG/DL (ref 70–99)
HDLC SERPL-MCNC: 67 MG/DL
HIV 1+2 AB+HIV1 P24 AG SERPL QL IA: NON REACTIVE
LDLC SERPL CALC-MCNC: 119 MG/DL (ref 0–99)
POTASSIUM SERPL-SCNC: 4.3 MMOL/L (ref 3.5–5.2)
PROT SERPL-MCNC: 6.6 G/DL (ref 6–8.5)
SODIUM SERPL-SCNC: 139 MMOL/L (ref 134–144)
TRIGL SERPL-MCNC: 114 MG/DL (ref 0–149)
TSH SERPL DL<=0.005 MIU/L-ACNC: 1.95 UIU/ML (ref 0.45–4.5)
VLDLC SERPL CALC-MCNC: 20 MG/DL (ref 5–40)

## 2023-06-12 NOTE — PROGRESS NOTES
HISTORY OF PRESENT ILLNESS  Isidra Davis is a 60 y.o. female. HPI   Last here 1/15/19. Pt is here for routine care.         BP today is 192/119-- will get repeat   BP at home has been running 170/90   Is supposed to be on losartan 50mg and HCTZ 25mg  However she has not been taking any of her BP meds x 3 mos   Discussed health risks of htn   Discussed importance of compliance with bp meds        Wt today is 158 lbs-- down 8 lbs x lov   Discussed diet and weight loss     Will get labs today      Pt saw Dr. Camille Reid (derm) for sores  Pt has not seen this physician recently     Pt has seen Dr. Mir Perkins (uro-gyn) for recurrent hematuria  Last visit was 6/4/18  Per pt, a lesion was found in her bladder, had a FISH test and a CT scan  Reports all normal in hte end   Pt went to [facility] CT scan, per pt this was nl  Per pt, she discussed having urodynamics test and possible bulking procedure for leakage  However pt says she was going to hold off on this for now d/t costs     Pt saw Dr Manoj Carlson (ortho) for R shoulder pain  Last visit was 9/4/18   Her sx improved      She has not been taking lipitor 80mg daily for cholesterol   Discussed compliance, pt states she will restart      prev decreased prozac to 20mg (from 40), and added wellbutrin 150mg daily   However pt has not been taking either of these   Pt states she has had a lot more stress and feeling more down with her family stress with her son   She has been seeing a counselor in the last couple weeks   She states every time she feels like this is helping her stress returns   Discussed restarting meds  Will restart prozac 20 mg and taper up to 40 mg   Pt has xanax to use prn (once weekly or less) as well, works well, discussed using this occasionally       Pt is noncompliant with CPAP qhs for JUAN ANTONIO  No interested in f/u with Dr Charan Self (sleep) currently      pt is now following with Dr James Sheikh (cardio)   Reviewed notes 4/9/19: Mrs. Lambert's echo 1/31/19 demonstrated EF 56-60% with mild to moderate mitral valve regurgitation. BP is elevated today at 156/106. She states she has not been taking her antihypertensive medications regularly. I recommended she restart Cozaar 50 mg daily and HCTZ 25 mg daily. EKG today is good. Follow up in 2 weeks to reassess blood pressure  Reviewed echo 19: Estimated left ventricular ejection fraction is 56 - 60%. Normal left ventricular wall motion, no regional wall motion abnormality noted. · Mild to moderate mitral valve regurgitation. · Mild tricuspid valve regurgitation is present. Technically difficult study. Pt went to [facility] and was found to have bronchitis 2 weeks ago   Per pt, she had a cxr which showed borderline pneumonia   Was given augmentin, prednisone and tesalon perles   Pt states she got better but sxs returned, states now congestion is mostly in her sinuses   Still having cough     Pt c/o soreness on R side of her neck x 3 days   Will restart augmentin   Advised to use flonase otc        Pt has restarted smoking   Now smoking about 0.5 ppd   Pt does not think she is at a place where she can quit   CT lung cancer screen prev not covered, pt would not like this reordered today     Reviewed colo 3/12/19 with dr Deanne Albright-- 5 year repeat      ACP not on file. SDM is her .   Provided information in the past.       PREVENTIVE:    Colonoscopy: 3/12/19 with Dr Sedrick Barragan, 5 year repeat   Pap: Dr. Yesenia Rebollar , h/o hysterectomy and BSO in her 31's for endometriosis   Mammogram: , negative, due   Dexa: due, declines for now, wants to wait till age 63, ordered, reminded again  Tdap: 2012  Pneumovax: not yet needed  Shingrix: ordered 18   Flu shot: 19   Eye exam: 10/17, [facility]", due  A1c:  5.8,  5.8,  5.4,  5.4,  5.5   Lipids:    Hep C Screenin/24/15, negative  EK18 nsr  CT lung cancer screen: not covered, pt will hold off       Patient Active Problem List    Diagnosis Date Noted    Depression 09/21/2012    Anxiety 09/21/2012    Hyperlipidemia 09/21/2012    HTN (hypertension) 09/21/2012    Microscopic hematuria 09/21/2012    Fatty liver 09/21/2012    JUAN ANTONIO on CPAP 09/21/2012     Current Outpatient Medications   Medication Sig Dispense Refill    hydroCHLOROthiazide (HYDRODIURIL) 25 mg tablet TAKE 1 TABLET BY MOUTH DAILY 90 Tab 0    losartan (COZAAR) 50 mg tablet TAKE 1 TABLET BY MOUTH DAILY 90 Tab 0    nystatin (MYCOSTATIN) powder aaa tid prn 60 g 0    FLUoxetine (PROZAC) 20 mg tablet Take 1 Tab by mouth daily. 30 Tab 2    FLUoxetine (PROZAC) 20 mg tablet Take 1 Tab by mouth daily. 30 Tab 2    ALPRAZolam (XANAX) 0.25 mg tablet TAKE 1 TABLET BY MOUTH EVERY EVENING AS NEEDED 20 Tab 0    buPROPion XL (WELLBUTRIN XL) 150 mg tablet TAKE 1 TABLET BY MOUTH EVERY MORNING 30 Tab 0    PSEUDOEPHEDRINE/ACETAMINOPHEN (TYLENOL SINUS PO) Take  by mouth daily as needed.  albuterol (PROAIR HFA) 90 mcg/actuation inhaler Take 2 Puffs by inhalation every four (4) hours as needed for Wheezing.  1 Inhaler 3    atorvastatin (LIPITOR) 80 mg tablet TAKE 1 TABLET BY MOUTH NIGHTLY 90 Tab 0     Past Surgical History:   Procedure Laterality Date    HX BREAST BIOPSY Right     20 years ago    HX HYSTERECTOMY      HX ORTHOPAEDIC      right foot surgery    HX TONSILLECTOMY        Lab Results   Component Value Date/Time    WBC 6.8 07/30/2018 09:02 AM    HGB 14.9 07/30/2018 09:02 AM    HCT 44.3 07/30/2018 09:02 AM    PLATELET 316 84/12/6817 09:02 AM    MCV 93 07/30/2018 09:02 AM     Lab Results   Component Value Date/Time    Cholesterol, total 228 (H) 07/30/2018 09:02 AM    HDL Cholesterol 72 07/30/2018 09:02 AM    LDL, calculated 129 (H) 07/30/2018 09:02 AM    Triglyceride 134 07/30/2018 09:02 AM     Lab Results   Component Value Date/Time    GFR est non-AA 96 07/30/2018 09:02 AM    GFR est  07/30/2018 09:02 AM    Creatinine 0.67 07/30/2018 09:02 AM    BUN 13 07/30/2018 09:02 AM    Sodium 141 07/30/2018 09:02 AM    Potassium 3.9 07/30/2018 09:02 AM    Chloride 100 07/30/2018 09:02 AM    CO2 22 07/30/2018 09:02 AM        Review of Systems   Constitutional: Negative for chills and fever. HENT: Positive for congestion. Respiratory: Positive for cough. Negative for shortness of breath and wheezing. Cardiovascular: Negative for chest pain. Physical Exam  Constitutional:       General: She is not in acute distress. Appearance: She is well-developed. She is not diaphoretic. HENT:      Head: Normocephalic and atraumatic. Right Ear: Tympanic membrane, ear canal and external ear normal.      Left Ear: Tympanic membrane, ear canal and external ear normal.      Ears:      Comments: Erythema in R  Ear      Mouth/Throat:      Mouth: Mucous membranes are moist.      Pharynx: Oropharynx is clear. No oropharyngeal exudate or posterior oropharyngeal erythema. Eyes:      General:         Right eye: No discharge. Left eye: No discharge. Conjunctiva/sclera: Conjunctivae normal.   Neck:      Musculoskeletal: Normal range of motion and neck supple. Vascular: No carotid bruit. Comments: Tender lymph node on R side of neck   Cardiovascular:      Rate and Rhythm: Normal rate and regular rhythm. Heart sounds: Murmur (2/6 on L sternal border ) present. No friction rub. No gallop. Pulmonary:      Effort: Pulmonary effort is normal. No respiratory distress. Breath sounds: Normal breath sounds. No wheezing or rales. Chest:      Chest wall: No tenderness. Abdominal:      General: There is no distension. Palpations: Abdomen is soft. There is no mass. Tenderness: There is no tenderness. There is no guarding or rebound. Hernia: No hernia is present. Musculoskeletal: Normal range of motion. General: No tenderness or deformity. Lymphadenopathy:      Cervical: No cervical adenopathy. Skin:     General: Skin is warm and dry. Coloration: Skin is not pale. Findings: No erythema or rash. Neurological:      Mental Status: She is alert and oriented to person, place, and time. Coordination: Coordination normal.   Psychiatric:         Mood and Affect: Mood normal.         Behavior: Behavior normal.         ASSESSMENT and PLAN    ICD-10-CM ICD-9-CM    1. Reactive depression                        Pt with progressive depression has stopped all of her meds stressed importance of resuming meds and compliance she has just started seeing therapist will start prozac at 20 mg had been on 40 mg in the past continue with therapist  F32.9 300.4 CYNDI MAMMO BI SCREENING INCL CAD      LIPID PANEL      METABOLIC PANEL, COMPREHENSIVE      CBC W/O DIFF      TSH 3RD GENERATION      HEMOGLOBIN A1C WITH EAG   2. Pure hypercholesterolemia                          Was prev on lipitor 80 mg stopped taking this will recheck this today and restart lipitor  E78.00 272.0 CYNDI MAMMO BI SCREENING INCL CAD      LIPID PANEL      METABOLIC PANEL, COMPREHENSIVE      CBC W/O DIFF      TSH 3RD GENERATION      HEMOGLOBIN A1C WITH EAG   3. Essential hypertension                    Completely uncontrolled due to noncompliance restart hctz and losartan will have her rtc in 4 wks and adjust meds further at that time  I10 401.9 CYNDI MAMMO BI SCREENING INCL CAD      LIPID PANEL      METABOLIC PANEL, COMPREHENSIVE      CBC W/O DIFF      TSH 3RD GENERATION      HEMOGLOBIN A1C WITH EAG   4. JUAN ANTONIO on CPAP                      Not compliant with cpap not interested in f/u currently  G47.33 327.23 CYNDI MAMMO BI SCREENING INCL CAD    Z99.89 V46.8 LIPID PANEL      METABOLIC PANEL, COMPREHENSIVE      CBC W/O DIFF      TSH 3RD GENERATION      HEMOGLOBIN A1C WITH EAG   5.  Moderate mitral regurgitation                        Now seeing dr Helene Cardenas saw him last in April will have her f/u with him in April will likely need repeat echo needs to get back on her bp meds to prevent further deterioration of valve  I34.0 424.0 CYNDI MAMMO BI SCREENING INCL CAD      LIPID PANEL      METABOLIC PANEL, COMPREHENSIVE      CBC W/O DIFF      TSH 3RD GENERATION      HEMOGLOBIN A1C WITH EAG   6. Fatty liver                    Work on wt loss check lfts today  K76.0 571.8 CYNDI MAMMO BI SCREENING INCL CAD      LIPID PANEL      METABOLIC PANEL, COMPREHENSIVE      CBC W/O DIFF      TSH 3RD GENERATION      HEMOGLOBIN A1C WITH EAG     Z00.00 V70.0 CYNDI MAMMO BI SCREENING INCL CAD      LIPID PANEL      METABOLIC PANEL, COMPREHENSIVE      CBC W/O DIFF      TSH 3RD GENERATION      HEMOGLOBIN A1C WITH EAG   8. Neck pain                      Pt with URI appears to be enlarged lymph node related to this will tx with augmentin if this remains elevated will tx at f/u  M54.2 723.1 CYNDI MAMMO BI SCREENING INCL CAD      LIPID PANEL      METABOLIC PANEL, COMPREHENSIVE      CBC W/O DIFF      TSH 3RD GENERATION      HEMOGLOBIN A1C WITH EAG   9. URI, acute                  See above  J06.9 465.9 CYNDI MAMMO BI SCREENING INCL CAD      LIPID PANEL      METABOLIC PANEL, COMPREHENSIVE      CBC W/O DIFF      TSH 3RD GENERATION      HEMOGLOBIN A1C WITH EAG          Scribed by Jose Raul Wiseman of [address] [place] [address] [address], as dictated by Dr. Toan Potter. Current diagnosis and concerns discussed with pt at length. Pt understands risks and benefits or current treatment plan and medications, and accepts the treatment and medication with any possible risks. Pt asks appropriate questions, which were answered. Pt was instructed to call with any concerns or problems. I have reviewed the note documented by the scribe. The services provided are my own.   The documentation is accurate PAST MEDICAL HISTORY:  HTN (hypertension)     Prostate cancer

## 2023-09-18 DIAGNOSIS — F41.9 ANXIETY: Primary | ICD-10-CM

## 2023-09-18 RX ORDER — ALPRAZOLAM 0.25 MG/1
0.25 TABLET ORAL NIGHTLY PRN
Qty: 30 TABLET | Refills: 0 | Status: SHIPPED | OUTPATIENT
Start: 2023-09-18 | End: 2023-10-18

## 2023-09-18 NOTE — TELEPHONE ENCOUNTER
----- Message from Elisabeth. Mushtaq Conner sent at 9/18/2023 11:52 AM EDT -----  Regarding: Refill alprazolam  Contact: [phone]  My pharmacy requested a refill of my alprazolam and it was denied. Could this please be refilled?

## 2023-09-18 NOTE — TELEPHONE ENCOUNTER
PCP: Aundrea Womack MD    Last appt:   6/5/2023    No future appointments. Requested Prescriptions     Pending Prescriptions Disp Refills    ALPRAZolam (XANAX) 0.25 MG tablet 30 tablet 1     Sig: Take 1 tablet by mouth 3 times daily as needed for Anxiety for up to 30 days.  Max Daily Amount: 0.75 mg

## 2024-01-02 ASSESSMENT — ENCOUNTER SYMPTOMS: SHORTNESS OF BREATH: 0

## 2024-01-02 NOTE — PROGRESS NOTES
HISTORY OF PRESENT ILLNESS  Laury Gasca is a 68 y.o. female.  HPI    Last here 6/5/23. Pt is here for routine care.       Has a history of hypertension  BP today is 151/75 repeat improved to 136/84  BP at home 130/80   in 6/23: 107/75 133/71 125/77 116/73  On losartan 50mg and HCTZ 25mg - she has not taken these yet today  Discussed again she should always take BP medications before office visits    Wt today is 151 lbs, down 7 lbs since lov   Discussed diet and weight loss     Reviewed labs  Ordered labs     Pt saw Dr. Lundberg (derm) for sores  Pt has not seen this physician recently, discussed scheduling f/u for skin check  On exam: likely actinic keratosis.  Discussed precancerous likely need to see dermatology  Have encouraged her to f/U w/ derm.  She expressed understanding states she will     Pt has seen Dr. Alda Camara (uro-gyn) for recurrent hematuria  Last visit was 6/4/18  Per pt, a lesion was found in her bladder, had a FISH test and a CT scan  Reports all normal in the end   Will no longer be f/u      Pt previously saw Dr Montgomery (ortho) for R shoulder pain and acute foot pain  Last visit was 11/17/23 with NP Zak for L ankle sprain  Reviewed note:  ASSESSMENT:  1. Nondisplaced fracture of distal phalanx of right lesser toe(s), initial encounter for closed fracture       PLAN:  Plan: Comfortable shoe wear. Ice and elevation. This should resolve.      She saw Dr Flores (ortho) 9/19/23 for L hand arthritis  Reviewed note:  PLAN  Treatment Plan: Discussed treatment options. Patient has a splint which she is been using. Patient consents to injection to the left STT joint. Follow up p.r.n.     She saw Dr. Mittal (ortho) for lumbar and hip pain 10/17/22  Has not needed to f/U      Has hyperlipidemia  She continues on lipitor 80mg daily for cholesterol  Have previously discussed adding zetia, she states this caused muscle aches in the past cannot tolerate     She is taking prozac 60 mg for depression this is

## 2024-01-03 RX ORDER — FLUOXETINE 20 MG/1
20 TABLET, FILM COATED ORAL DAILY
Qty: 90 TABLET | Refills: 2 | Status: CANCELLED | OUTPATIENT
Start: 2024-01-03

## 2024-01-03 RX ORDER — ATORVASTATIN CALCIUM 80 MG/1
80 TABLET, FILM COATED ORAL DAILY
Qty: 90 TABLET | Refills: 1 | Status: CANCELLED | OUTPATIENT
Start: 2024-01-03

## 2024-01-03 RX ORDER — FLUOXETINE HYDROCHLORIDE 40 MG/1
40 CAPSULE ORAL DAILY
Qty: 90 CAPSULE | Refills: 2 | Status: CANCELLED | OUTPATIENT
Start: 2024-01-03

## 2024-01-03 RX ORDER — LOSARTAN POTASSIUM 50 MG/1
50 TABLET ORAL DAILY
Qty: 90 TABLET | Refills: 1 | Status: CANCELLED | OUTPATIENT
Start: 2024-01-03

## 2024-01-03 NOTE — TELEPHONE ENCOUNTER
Future Appointments:  Future Appointments   Date Time Provider Department Center   1/8/2024  9:00 AM Jaimie Camara MD [facility] BS AMB        Last Appointment With Me:  6/5/2023     Requested Prescriptions     Pending Prescriptions Disp Refills    atorvastatin (LIPITOR) 80 MG tablet 90 tablet 1     Sig: Take 1 tablet by mouth daily    FLUoxetine (PROZAC) 20 MG tablet 90 tablet 2     Sig: Take 1 tablet by mouth daily Take with 40mg to equal 60mg    FLUoxetine (PROZAC) 40 MG capsule 90 capsule 2     Sig: Take 1 capsule by mouth daily Take with 20mg to equal 60mg    losartan (COZAAR) 50 MG tablet 90 tablet 1     Sig: Take 1 tablet by mouth daily

## 2024-01-03 NOTE — TELEPHONE ENCOUNTER
Pt confirmed appt for 1/8/24. Pt is requesting a refill of atrovastain 80 mg, fluoxetine 20 mg and the 40 mg, and losartan 50 mg. Please send to [facility] [address].

## 2024-01-08 ENCOUNTER — OFFICE VISIT (OUTPATIENT)
Age: 69
End: 2024-01-08
Payer: COMMERCIAL

## 2024-01-08 VITALS
WEIGHT: 151 LBS | HEART RATE: 79 BPM | OXYGEN SATURATION: 97 % | DIASTOLIC BLOOD PRESSURE: 84 MMHG | HEIGHT: 65 IN | BODY MASS INDEX: 25.16 KG/M2 | TEMPERATURE: 98.1 F | SYSTOLIC BLOOD PRESSURE: 136 MMHG | RESPIRATION RATE: 15 BRPM

## 2024-01-08 DIAGNOSIS — Z00.00 PHYSICAL EXAM: Primary | ICD-10-CM

## 2024-01-08 DIAGNOSIS — F33.41 RECURRENT MAJOR DEPRESSIVE DISORDER, IN PARTIAL REMISSION (HCC): ICD-10-CM

## 2024-01-08 DIAGNOSIS — F41.9 ANXIETY: ICD-10-CM

## 2024-01-08 DIAGNOSIS — I10 PRIMARY HYPERTENSION: ICD-10-CM

## 2024-01-08 DIAGNOSIS — G47.33 OSA ON CPAP: ICD-10-CM

## 2024-01-08 DIAGNOSIS — K76.0 FATTY LIVER: ICD-10-CM

## 2024-01-08 DIAGNOSIS — Z23 NEEDS FLU SHOT: ICD-10-CM

## 2024-01-08 DIAGNOSIS — E78.2 MIXED HYPERLIPIDEMIA: ICD-10-CM

## 2024-01-08 PROCEDURE — 90694 VACC AIIV4 NO PRSRV 0.5ML IM: CPT | Performed by: INTERNAL MEDICINE

## 2024-01-08 PROCEDURE — 90471 IMMUNIZATION ADMIN: CPT | Performed by: INTERNAL MEDICINE

## 2024-01-08 PROCEDURE — 3078F DIAST BP <80 MM HG: CPT | Performed by: INTERNAL MEDICINE

## 2024-01-08 PROCEDURE — 3075F SYST BP GE 130 - 139MM HG: CPT | Performed by: INTERNAL MEDICINE

## 2024-01-08 PROCEDURE — 99397 PER PM REEVAL EST PAT 65+ YR: CPT | Performed by: INTERNAL MEDICINE

## 2024-01-08 RX ORDER — HYDROCHLOROTHIAZIDE 25 MG/1
25 TABLET ORAL DAILY
Qty: 90 TABLET | Refills: 1 | Status: SHIPPED | OUTPATIENT
Start: 2024-01-08

## 2024-01-08 RX ORDER — FLUOXETINE 20 MG/1
20 TABLET, FILM COATED ORAL DAILY
Qty: 90 TABLET | Refills: 2 | Status: SHIPPED | OUTPATIENT
Start: 2024-01-08

## 2024-01-08 RX ORDER — LOSARTAN POTASSIUM 50 MG/1
50 TABLET ORAL DAILY
Qty: 90 TABLET | Refills: 1 | Status: SHIPPED | OUTPATIENT
Start: 2024-01-08

## 2024-01-08 RX ORDER — ATORVASTATIN CALCIUM 80 MG/1
80 TABLET, FILM COATED ORAL DAILY
Qty: 90 TABLET | Refills: 1 | Status: SHIPPED | OUTPATIENT
Start: 2024-01-08

## 2024-01-08 RX ORDER — FLUOXETINE HYDROCHLORIDE 40 MG/1
40 CAPSULE ORAL DAILY
Qty: 90 CAPSULE | Refills: 1 | Status: SHIPPED | OUTPATIENT
Start: 2024-01-08

## 2024-01-08 RX ORDER — ALPRAZOLAM 0.25 MG/1
TABLET ORAL
COMMUNITY

## 2024-01-08 ASSESSMENT — PATIENT HEALTH QUESTIONNAIRE - PHQ9
2. FEELING DOWN, DEPRESSED OR HOPELESS: 0
1. LITTLE INTEREST OR PLEASURE IN DOING THINGS: 0
SUM OF ALL RESPONSES TO PHQ QUESTIONS 1-9: 0
SUM OF ALL RESPONSES TO PHQ9 QUESTIONS 1 & 2: 0
SUM OF ALL RESPONSES TO PHQ QUESTIONS 1-9: 0

## 2024-01-08 NOTE — PROGRESS NOTES
\"Have you been to the ER, urgent care clinic since your last visit?  Hospitalized since your last visit?\"    NO    “Have you seen or consulted any other health care providers outside of [facility] since your last visit?”    NO       Have you had a mammogram?”   NO

## 2024-01-29 ENCOUNTER — TELEPHONE (OUTPATIENT)
Age: 69
End: 2024-01-29

## 2024-03-11 ENCOUNTER — TELEPHONE (OUTPATIENT)
Age: 69
End: 2024-03-11

## 2024-03-11 NOTE — TELEPHONE ENCOUNTER
Left message for patient to schedule mammogram ordered by PCP  Requested patient to return call to panel manager at [phone] to schedule mammogram or notify that mammogram   has been completed at an outside facility.

## 2024-04-01 ENCOUNTER — HOSPITAL ENCOUNTER (OUTPATIENT)
Facility: HOSPITAL | Age: 69
Discharge: HOME | End: 2024-04-04
Attending: INTERNAL MEDICINE
Payer: COMMERCIAL

## 2024-04-01 VITALS — WEIGHT: 151.01 LBS | BODY MASS INDEX: 25.16 KG/M2 | HEIGHT: 65 IN

## 2024-04-01 DIAGNOSIS — Z12.39 BREAST SCREENING: ICD-10-CM

## 2024-04-01 DIAGNOSIS — I10 PRIMARY HYPERTENSION: ICD-10-CM

## 2024-04-01 DIAGNOSIS — R73.01 IFG (IMPAIRED FASTING GLUCOSE): ICD-10-CM

## 2024-04-01 DIAGNOSIS — Z78.0 POST-MENOPAUSE: ICD-10-CM

## 2024-04-01 DIAGNOSIS — Z87.891 PERSONAL HISTORY OF NICOTINE DEPENDENCE: ICD-10-CM

## 2024-04-01 DIAGNOSIS — Z11.4 ENCOUNTER FOR SCREENING FOR HIV: ICD-10-CM

## 2024-04-01 DIAGNOSIS — E78.2 MIXED HYPERLIPIDEMIA: ICD-10-CM

## 2024-04-01 PROCEDURE — 77067 SCR MAMMO BI INCL CAD: CPT

## 2024-04-17 ENCOUNTER — TELEPHONE (OUTPATIENT)
Age: 69
End: 2024-04-17

## 2024-04-17 RX ORDER — FLUOXETINE HYDROCHLORIDE 20 MG/1
20 CAPSULE ORAL DAILY
Qty: 90 CAPSULE | Refills: 2 | Status: SHIPPED | OUTPATIENT
Start: 2024-04-17

## 2024-04-17 NOTE — TELEPHONE ENCOUNTER
Pharmacy Progress Note - Medication Access    Contacted [facility] regarding Ms. Laury SARAH Blow 68 y.o. 's fluoxetine access     Patient's previous insurance paid for fluoxetine 20 mg tablet. This is more expensive so new insurance prefers the capsule formulation.    Discussed findings with Dr. Camara. Will send in rx for fluoxetine 20 mg capsules. Patient will continue to take this daily with 40 mg to total 60 mg/day.   Will share updates with patient via [employer].       Medications Discontinued During This Encounter   Medication Reason    FLUoxetine (PROZAC) 20 MG tablet REORDER     Orders Placed This Encounter    FLUoxetine (PROZAC) 20 MG capsule     Sig: Take 1 capsule by mouth daily To be taken with 40 mg to total 60 mg daily.     Dispense:  90 capsule     Refill:  2           Thank you for the consult,  Maria Alejandra Ascencio, ColeD, BCACP, [facility][note 3]ES          For Pharmacy Admin Tracking Only    Program: Medical Group  CPA in place:  Yes  Recommendation Provided To: Provider: 3 via Verb[employer] to provider, Patient/Caregiver: 3 via Telephone and [employer] Message, and Pharmacy: 3  Intervention Detail: Discontinued Rx: 1, reason: Cost/Formulary Change, New Rx: 1, reason: Cost/Formulary Change, and Patient Access Assistance/Sample Provided  Intervention Accepted By: Provider: 3, Patient/Caregiver: 3, and Pharmacy: 3  Gap Closed?: Yes   Time Spent (min): 15

## 2024-05-20 ENCOUNTER — PATIENT MESSAGE (OUTPATIENT)
Age: 69
End: 2024-05-20

## 2024-05-20 DIAGNOSIS — F41.9 ANXIETY: Primary | ICD-10-CM

## 2024-05-20 NOTE — TELEPHONE ENCOUNTER
Future Appointments:  Future Appointments   Date Time Provider Department Center   7/8/2024  9:15 AM Jaimie Camara MD [facility]        Last Appointment With Me:  1/8/2024     Requested Prescriptions     Pending Prescriptions Disp Refills    ALPRAZolam (XANAX) 0.25 MG tablet 30 tablet 0     Sig: Take 1 tablet by mouth nightly as needed for Anxiety for up to 30 days

## 2024-05-20 NOTE — TELEPHONE ENCOUNTER
Ivon Pimentel MA 5/20/2024 11:33 AM EDT      ----- Message -----  From: Laury Gasca  Sent: 5/20/2024 11:31 AM EDT  To: *  Subject: Alprazolam refill     I would like a refill of my alprazolam. I have not had a refill since 9/13/23. Thanks     The pharmacy has requested this refill last week with no response.

## 2024-05-22 RX ORDER — ALPRAZOLAM 0.25 MG/1
TABLET ORAL
Qty: 30 TABLET | Refills: 0 | Status: SHIPPED | OUTPATIENT
Start: 2024-05-22 | End: 2024-06-19

## 2024-06-30 RX ORDER — FLUOXETINE HYDROCHLORIDE 40 MG/1
CAPSULE ORAL
Qty: 90 CAPSULE | Refills: 1 | Status: SHIPPED | OUTPATIENT
Start: 2024-06-30

## 2024-06-30 RX ORDER — LOSARTAN POTASSIUM 50 MG/1
50 TABLET ORAL DAILY
Qty: 90 TABLET | Refills: 0 | Status: SHIPPED | OUTPATIENT
Start: 2024-06-30

## 2024-06-30 RX ORDER — ATORVASTATIN CALCIUM 80 MG/1
80 TABLET, FILM COATED ORAL DAILY
Qty: 90 TABLET | Refills: 1 | Status: SHIPPED | OUTPATIENT
Start: 2024-06-30

## 2024-09-13 RX ORDER — HYDROCHLOROTHIAZIDE 25 MG/1
25 TABLET ORAL DAILY
Qty: 30 TABLET | Refills: 0 | Status: SHIPPED | OUTPATIENT
Start: 2024-09-13

## 2024-09-24 ENCOUNTER — OFFICE VISIT (OUTPATIENT)
Age: 69
End: 2024-09-24

## 2024-09-24 VITALS
HEIGHT: 65 IN | OXYGEN SATURATION: 99 % | TEMPERATURE: 98.2 F | SYSTOLIC BLOOD PRESSURE: 115 MMHG | HEART RATE: 75 BPM | BODY MASS INDEX: 25.83 KG/M2 | WEIGHT: 155 LBS | RESPIRATION RATE: 18 BRPM | DIASTOLIC BLOOD PRESSURE: 76 MMHG

## 2024-09-24 DIAGNOSIS — F33.41 RECURRENT MAJOR DEPRESSIVE DISORDER, IN PARTIAL REMISSION (HCC): ICD-10-CM

## 2024-09-24 DIAGNOSIS — Z23 NEEDS FLU SHOT: ICD-10-CM

## 2024-09-24 DIAGNOSIS — G47.33 OSA ON CPAP: ICD-10-CM

## 2024-09-24 DIAGNOSIS — Z78.0 POST-MENOPAUSAL: ICD-10-CM

## 2024-09-24 DIAGNOSIS — J32.1 CHRONIC FRONTAL SINUSITIS: ICD-10-CM

## 2024-09-24 DIAGNOSIS — F17.200 SMOKER: ICD-10-CM

## 2024-09-24 DIAGNOSIS — R73.01 IFG (IMPAIRED FASTING GLUCOSE): ICD-10-CM

## 2024-09-24 DIAGNOSIS — E78.2 MIXED HYPERLIPIDEMIA: ICD-10-CM

## 2024-09-24 DIAGNOSIS — I10 PRIMARY HYPERTENSION: Primary | ICD-10-CM

## 2024-09-24 DIAGNOSIS — Z87.891 PERSONAL HISTORY OF NICOTINE DEPENDENCE: ICD-10-CM

## 2024-09-24 DIAGNOSIS — L73.9 FOLLICULITIS: ICD-10-CM

## 2024-09-24 RX ORDER — DOXYCYCLINE HYCLATE 100 MG
100 TABLET ORAL 2 TIMES DAILY
Qty: 20 TABLET | Refills: 0 | Status: SHIPPED | OUTPATIENT
Start: 2024-09-24 | End: 2024-10-04

## 2024-09-24 SDOH — ECONOMIC STABILITY: FOOD INSECURITY: WITHIN THE PAST 12 MONTHS, YOU WORRIED THAT YOUR FOOD WOULD RUN OUT BEFORE YOU GOT MONEY TO BUY MORE.: NEVER TRUE

## 2024-09-24 SDOH — ECONOMIC STABILITY: FOOD INSECURITY: WITHIN THE PAST 12 MONTHS, THE FOOD YOU BOUGHT JUST DIDN'T LAST AND YOU DIDN'T HAVE MONEY TO GET MORE.: NEVER TRUE

## 2024-09-24 SDOH — ECONOMIC STABILITY: INCOME INSECURITY: HOW HARD IS IT FOR YOU TO PAY FOR THE VERY BASICS LIKE FOOD, HOUSING, MEDICAL CARE, AND HEATING?: NOT HARD AT ALL

## 2024-09-24 ASSESSMENT — PATIENT HEALTH QUESTIONNAIRE - PHQ9
7. TROUBLE CONCENTRATING ON THINGS, SUCH AS READING THE NEWSPAPER OR WATCHING TELEVISION: NOT AT ALL
8. MOVING OR SPEAKING SO SLOWLY THAT OTHER PEOPLE COULD HAVE NOTICED. OR THE OPPOSITE, BEING SO FIGETY OR RESTLESS THAT YOU HAVE BEEN MOVING AROUND A LOT MORE THAN USUAL: NOT AT ALL
9. THOUGHTS THAT YOU WOULD BE BETTER OFF DEAD, OR OF HURTING YOURSELF: NOT AT ALL
SUM OF ALL RESPONSES TO PHQ QUESTIONS 1-9: 2
SUM OF ALL RESPONSES TO PHQ9 QUESTIONS 1 & 2: 2
10. IF YOU CHECKED OFF ANY PROBLEMS, HOW DIFFICULT HAVE THESE PROBLEMS MADE IT FOR YOU TO DO YOUR WORK, TAKE CARE OF THINGS AT HOME, OR GET ALONG WITH OTHER PEOPLE: NOT DIFFICULT AT ALL
1. LITTLE INTEREST OR PLEASURE IN DOING THINGS: SEVERAL DAYS
4. FEELING TIRED OR HAVING LITTLE ENERGY: NOT AT ALL
SUM OF ALL RESPONSES TO PHQ QUESTIONS 1-9: 2
3. TROUBLE FALLING OR STAYING ASLEEP: NOT AT ALL
SUM OF ALL RESPONSES TO PHQ QUESTIONS 1-9: 2
5. POOR APPETITE OR OVEREATING: NOT AT ALL
2. FEELING DOWN, DEPRESSED OR HOPELESS: SEVERAL DAYS
6. FEELING BAD ABOUT YOURSELF - OR THAT YOU ARE A FAILURE OR HAVE LET YOURSELF OR YOUR FAMILY DOWN: NOT AT ALL
SUM OF ALL RESPONSES TO PHQ QUESTIONS 1-9: 2

## 2024-09-25 LAB
ALBUMIN SERPL-MCNC: 3.8 G/DL (ref 3.5–5)
ALBUMIN/GLOB SERPL: 1.1 (ref 1.1–2.2)
ALP SERPL-CCNC: 92 U/L (ref 45–117)
ALT SERPL-CCNC: 31 U/L (ref 12–78)
ANION GAP SERPL CALC-SCNC: 9 MMOL/L (ref 2–12)
AST SERPL-CCNC: 18 U/L (ref 15–37)
BILIRUB SERPL-MCNC: 0.4 MG/DL (ref 0.2–1)
BUN SERPL-MCNC: 14 MG/DL (ref 6–20)
BUN/CREAT SERPL: 16 (ref 12–20)
CALCIUM SERPL-MCNC: 9.4 MG/DL (ref 8.5–10.1)
CHLORIDE SERPL-SCNC: 106 MMOL/L (ref 97–108)
CHOLEST SERPL-MCNC: 206 MG/DL
CO2 SERPL-SCNC: 26 MMOL/L (ref 21–32)
CREAT SERPL-MCNC: 0.89 MG/DL (ref 0.55–1.02)
ERYTHROCYTE [DISTWIDTH] IN BLOOD BY AUTOMATED COUNT: 12.3 % (ref 11.5–14.5)
EST. AVERAGE GLUCOSE BLD GHB EST-MCNC: 108 MG/DL
GLOBULIN SER CALC-MCNC: 3.4 G/DL (ref 2–4)
GLUCOSE SERPL-MCNC: 103 MG/DL (ref 65–100)
HBA1C MFR BLD: 5.4 % (ref 4–5.6)
HCT VFR BLD AUTO: 43.2 % (ref 35–47)
HDLC SERPL-MCNC: 88 MG/DL
HDLC SERPL: 2.3 (ref 0–5)
HGB BLD-MCNC: 14.1 G/DL (ref 11.5–16)
LDLC SERPL CALC-MCNC: 94.6 MG/DL (ref 0–100)
MCH RBC QN AUTO: 31.4 PG (ref 26–34)
MCHC RBC AUTO-ENTMCNC: 32.6 G/DL (ref 30–36.5)
MCV RBC AUTO: 96.2 FL (ref 80–99)
NRBC # BLD: 0 K/UL (ref 0–0.01)
NRBC BLD-RTO: 0 PER 100 WBC
PLATELET # BLD AUTO: 308 K/UL (ref 150–400)
PMV BLD AUTO: 9.8 FL (ref 8.9–12.9)
POTASSIUM SERPL-SCNC: 4 MMOL/L (ref 3.5–5.1)
PROT SERPL-MCNC: 7.2 G/DL (ref 6.4–8.2)
RBC # BLD AUTO: 4.49 M/UL (ref 3.8–5.2)
SODIUM SERPL-SCNC: 141 MMOL/L (ref 136–145)
TRIGL SERPL-MCNC: 117 MG/DL
TSH SERPL DL<=0.05 MIU/L-ACNC: 1.58 UIU/ML (ref 0.36–3.74)
VLDLC SERPL CALC-MCNC: 23.4 MG/DL
WBC # BLD AUTO: 8.4 K/UL (ref 3.6–11)

## 2024-09-27 RX ORDER — LOSARTAN POTASSIUM 50 MG/1
50 TABLET ORAL DAILY
Qty: 90 TABLET | Refills: 0 | Status: SHIPPED | OUTPATIENT
Start: 2024-09-27

## 2024-09-29 RX ORDER — HYDROCHLOROTHIAZIDE 25 MG/1
25 TABLET ORAL DAILY
Qty: 90 TABLET | Refills: 1 | Status: SHIPPED | OUTPATIENT
Start: 2024-09-29

## 2024-11-11 ENCOUNTER — HOSPITAL ENCOUNTER (OUTPATIENT)
Facility: HOSPITAL | Age: 69
Discharge: HOME | End: 2024-11-14
Attending: INTERNAL MEDICINE
Payer: COMMERCIAL

## 2024-11-11 DIAGNOSIS — Z87.891 PERSONAL HISTORY OF NICOTINE DEPENDENCE: ICD-10-CM

## 2024-11-11 PROCEDURE — 71271 CT THORAX LUNG CANCER SCR C-: CPT

## 2024-12-26 RX ORDER — ATORVASTATIN CALCIUM 80 MG/1
80 TABLET, FILM COATED ORAL DAILY
Qty: 90 TABLET | Refills: 1 | Status: SHIPPED | OUTPATIENT
Start: 2024-12-26

## 2024-12-26 RX ORDER — LOSARTAN POTASSIUM 50 MG/1
50 TABLET ORAL DAILY
Qty: 90 TABLET | Refills: 0 | Status: SHIPPED | OUTPATIENT
Start: 2024-12-26

## 2024-12-26 RX ORDER — FLUOXETINE 40 MG/1
CAPSULE ORAL
Qty: 90 CAPSULE | Refills: 1 | Status: SHIPPED | OUTPATIENT
Start: 2024-12-26

## 2025-03-21 ENCOUNTER — TELEPHONE (OUTPATIENT)
Age: 70
End: 2025-03-21

## 2025-03-21 NOTE — TELEPHONE ENCOUNTER
Spoke to patient and informed them they would be unable to see another provider for a follow up appointment. Offered to send a message if patient would like to switch providers. Patient states will keep appointment on 4/11/25.

## 2025-03-21 NOTE — TELEPHONE ENCOUNTER
----- Message from Carolina WATTS sent at 3/21/2025 11:06 AM EDT -----  Regarding: [facility] Appointment Request  [facility] Appointment Request    Patient needs appointment for [facility] Appointment Type: Existing Condition Follow Up.    Patient Requested Dates(s): Thursday or Friday  Patient Requested Time: afternoon on thurs or Friday anytime  Provider Name: any provider    Reason for Appointment Request: Other wants to see any other provider   Pt is calling as she have an appt for virtual appt on 4/11/2025 at 3:00pm for her DrSusan But she wanted to see any other provider in the same office for a one time visit only and in-person appt it is for her 6 months follow up appt  --------------------------------------------------------------------------------------------------------------------------    Relationship to Patient: Self     Call Back Information: OK to leave message on voicemail  Preferred Call Back Number: Phone [phone]

## 2025-03-26 RX ORDER — HYDROCHLOROTHIAZIDE 25 MG/1
25 TABLET ORAL DAILY
Qty: 30 TABLET | Refills: 0 | Status: SHIPPED | OUTPATIENT
Start: 2025-03-26

## 2025-03-26 NOTE — PROGRESS NOTES
needed infrequently      5. Fatty liver     Work on wt loss, monitor lft      6. Microscopic hematuria         Follows urogyn  As needed     Ifg--monitor A1c, diet controlled  Osteopenia check bone density  Colon cancer screening overdue reminded her she will schedule    I have reviewed the note documented by the scribe.  The services provided are my own.  The documentation is accurate     Depression screen reviewed and negative.  Scribed by Sabra Mathews [facility], as dictated by Dr. Jaimie Camara.    Current diagnosis and concerns discussed with pt at length. Pt understands risks and benefits or current treatment plan and medications, and accepts the treatment and medication with any possible risks. Pt asks appropriate questions, which were answered. Pt was instructed to call with any concerns or problems.    I have reviewed the note documented by the scribe. The services provided are my own. The documentation is accurate.  Laury Benavidezcleo, was evaluated through a synchronous (real-time) audio-video encounter. The patient (or guardian if applicable) is aware that this is a billable service, which includes applicable co-pays. This Virtual Visit was conducted with patient's (and/or legal guardian's) consent. Patient identification was verified, and a caregiver was present when appropriate.   The patient was located at Home: [address] [place] [id number]  Provider was located at Home (Appt Dept State[place]  Confirm you are appropriately licensed, registered, or certified to deliver care in the state where the patient is located as indicated above. If you are not or unsure, please re-schedule the visit: Yes, I confirm.      --Jaimie Camara MD on 4/10/2025 at 6:14 PM  An electronic signature was used to authenticate this note.

## 2025-03-28 RX ORDER — LOSARTAN POTASSIUM 50 MG/1
50 TABLET ORAL DAILY
Qty: 30 TABLET | Refills: 0 | Status: SHIPPED | OUTPATIENT
Start: 2025-03-28

## 2025-03-28 RX ORDER — LOSARTAN POTASSIUM 50 MG/1
50 TABLET ORAL DAILY
Qty: 90 TABLET | OUTPATIENT
Start: 2025-03-28

## 2025-04-11 ENCOUNTER — TELEMEDICINE (OUTPATIENT)
Age: 70
End: 2025-04-11

## 2025-04-11 DIAGNOSIS — R73.01 IFG (IMPAIRED FASTING GLUCOSE): ICD-10-CM

## 2025-04-11 DIAGNOSIS — G47.33 OSA ON CPAP: ICD-10-CM

## 2025-04-11 DIAGNOSIS — E78.2 MIXED HYPERLIPIDEMIA: ICD-10-CM

## 2025-04-11 DIAGNOSIS — I10 PRIMARY HYPERTENSION: Primary | ICD-10-CM

## 2025-04-11 DIAGNOSIS — Z12.39 BREAST SCREENING: ICD-10-CM

## 2025-04-11 DIAGNOSIS — M85.89 OSTEOPENIA OF MULTIPLE SITES: ICD-10-CM

## 2025-04-11 DIAGNOSIS — F33.41 RECURRENT MAJOR DEPRESSIVE DISORDER, IN PARTIAL REMISSION: ICD-10-CM

## 2025-04-11 DIAGNOSIS — R31.29 MICROSCOPIC HEMATURIA: ICD-10-CM

## 2025-04-11 DIAGNOSIS — K76.0 FATTY LIVER: ICD-10-CM

## 2025-04-11 DIAGNOSIS — I10 PRIMARY HYPERTENSION: ICD-10-CM

## 2025-04-11 DIAGNOSIS — F41.9 ANXIETY: ICD-10-CM

## 2025-04-11 DIAGNOSIS — Z12.11 COLON CANCER SCREENING: ICD-10-CM

## 2025-04-11 PROCEDURE — 99214 OFFICE O/P EST MOD 30 MIN: CPT | Performed by: INTERNAL MEDICINE

## 2025-04-11 SDOH — ECONOMIC STABILITY: TRANSPORTATION INSECURITY
IN THE PAST 12 MONTHS, HAS THE LACK OF TRANSPORTATION KEPT YOU FROM MEDICAL APPOINTMENTS OR FROM GETTING MEDICATIONS?: NO

## 2025-04-11 SDOH — ECONOMIC STABILITY: FOOD INSECURITY: WITHIN THE PAST 12 MONTHS, THE FOOD YOU BOUGHT JUST DIDN'T LAST AND YOU DIDN'T HAVE MONEY TO GET MORE.: NEVER TRUE

## 2025-04-11 SDOH — ECONOMIC STABILITY: INCOME INSECURITY: IN THE LAST 12 MONTHS, WAS THERE A TIME WHEN YOU WERE NOT ABLE TO PAY THE MORTGAGE OR RENT ON TIME?: NO

## 2025-04-11 SDOH — ECONOMIC STABILITY: FOOD INSECURITY: WITHIN THE PAST 12 MONTHS, YOU WORRIED THAT YOUR FOOD WOULD RUN OUT BEFORE YOU GOT MONEY TO BUY MORE.: NEVER TRUE

## 2025-04-11 SDOH — ECONOMIC STABILITY: TRANSPORTATION INSECURITY
IN THE PAST 12 MONTHS, HAS LACK OF TRANSPORTATION KEPT YOU FROM MEETINGS, WORK, OR FROM GETTING THINGS NEEDED FOR DAILY LIVING?: NO

## 2025-04-11 ASSESSMENT — PATIENT HEALTH QUESTIONNAIRE - PHQ9
1. LITTLE INTEREST OR PLEASURE IN DOING THINGS: NOT AT ALL
SUM OF ALL RESPONSES TO PHQ QUESTIONS 1-9: 0
2. FEELING DOWN, DEPRESSED OR HOPELESS: NOT AT ALL
SUM OF ALL RESPONSES TO PHQ QUESTIONS 1-9: 0

## 2025-05-27 RX ORDER — ATORVASTATIN CALCIUM 80 MG/1
80 TABLET, FILM COATED ORAL DAILY
Qty: 90 TABLET | Refills: 0 | Status: SHIPPED | OUTPATIENT
Start: 2025-05-27

## 2025-05-27 RX ORDER — FLUOXETINE HYDROCHLORIDE 40 MG/1
40 CAPSULE ORAL DAILY
Qty: 90 CAPSULE | Refills: 0 | Status: SHIPPED | OUTPATIENT
Start: 2025-05-27

## 2025-05-27 RX ORDER — HYDROCHLOROTHIAZIDE 25 MG/1
25 TABLET ORAL DAILY
Qty: 90 TABLET | Refills: 0 | Status: SHIPPED | OUTPATIENT
Start: 2025-05-27

## 2025-05-27 RX ORDER — LOSARTAN POTASSIUM 50 MG/1
50 TABLET ORAL DAILY
Qty: 90 TABLET | Refills: 0 | Status: SHIPPED | OUTPATIENT
Start: 2025-05-27

## 2025-08-21 RX ORDER — LOSARTAN POTASSIUM 50 MG/1
50 TABLET ORAL DAILY
Qty: 90 TABLET | Refills: 0 | Status: SHIPPED | OUTPATIENT
Start: 2025-08-21

## 2025-08-21 RX ORDER — ATORVASTATIN CALCIUM 80 MG/1
80 TABLET, FILM COATED ORAL DAILY
Qty: 90 TABLET | Refills: 1 | Status: SHIPPED | OUTPATIENT
Start: 2025-08-21

## 2025-08-21 RX ORDER — HYDROCHLOROTHIAZIDE 25 MG/1
25 TABLET ORAL DAILY
Qty: 90 TABLET | Refills: 0 | Status: SHIPPED | OUTPATIENT
Start: 2025-08-21

## 2025-08-21 RX ORDER — FLUOXETINE HYDROCHLORIDE 40 MG/1
40 CAPSULE ORAL DAILY
Qty: 90 CAPSULE | Refills: 3 | Status: SHIPPED | OUTPATIENT
Start: 2025-08-21

## 2025-09-05 DIAGNOSIS — E78.2 MIXED HYPERLIPIDEMIA: ICD-10-CM

## 2025-09-05 DIAGNOSIS — R73.01 IFG (IMPAIRED FASTING GLUCOSE): ICD-10-CM

## 2025-09-05 DIAGNOSIS — I10 PRIMARY HYPERTENSION: Primary | ICD-10-CM
